# Patient Record
Sex: FEMALE | Race: WHITE | NOT HISPANIC OR LATINO
[De-identification: names, ages, dates, MRNs, and addresses within clinical notes are randomized per-mention and may not be internally consistent; named-entity substitution may affect disease eponyms.]

---

## 2017-01-04 ENCOUNTER — APPOINTMENT (OUTPATIENT)
Dept: ENDOCRINOLOGY | Facility: CLINIC | Age: 47
End: 2017-01-04

## 2017-01-04 VITALS
HEIGHT: 62.6 IN | DIASTOLIC BLOOD PRESSURE: 83 MMHG | HEART RATE: 66 BPM | SYSTOLIC BLOOD PRESSURE: 128 MMHG | TEMPERATURE: 98.5 F | WEIGHT: 178 LBS | BODY MASS INDEX: 31.94 KG/M2

## 2017-01-04 DIAGNOSIS — R30.0 DYSURIA: ICD-10-CM

## 2017-01-05 LAB
25(OH)D3 SERPL-MCNC: 30.2 NG/ML
ANION GAP SERPL CALC-SCNC: 16 MMOL/L
APPEARANCE: CLEAR
BILIRUBIN URINE: NEGATIVE
BLOOD URINE: NEGATIVE
BUN SERPL-MCNC: 17 MG/DL
CALCIUM SERPL-MCNC: 9.5 MG/DL
CALCIUM SERPL-MCNC: 9.5 MG/DL
CHLORIDE SERPL-SCNC: 101 MMOL/L
CO2 SERPL-SCNC: 23 MMOL/L
COLOR: YELLOW
CREAT SERPL-MCNC: 0.76 MG/DL
GLUCOSE QUALITATIVE U: NORMAL MG/DL
GLUCOSE SERPL-MCNC: 85 MG/DL
HBA1C MFR BLD HPLC: 5.4 %
KETONES URINE: NEGATIVE
LEUKOCYTE ESTERASE URINE: NEGATIVE
NITRITE URINE: NEGATIVE
PARATHYROID HORMONE INTACT: 53 PG/ML
PH URINE: 5
POTASSIUM SERPL-SCNC: 3.9 MMOL/L
PROTEIN URINE: NEGATIVE MG/DL
SODIUM SERPL-SCNC: 140 MMOL/L
SPECIFIC GRAVITY URINE: 1.02
T3 SERPL-MCNC: 104 NG/DL
T4 FREE SERPL-MCNC: 1.2 NG/DL
TSH SERPL-ACNC: 2.24 UU/ML
UROBILINOGEN URINE: NORMAL MG/DL

## 2017-02-16 ENCOUNTER — APPOINTMENT (OUTPATIENT)
Dept: INTERNAL MEDICINE | Facility: CLINIC | Age: 47
End: 2017-02-16

## 2017-02-16 VITALS
TEMPERATURE: 98.3 F | BODY MASS INDEX: 31.22 KG/M2 | HEART RATE: 69 BPM | DIASTOLIC BLOOD PRESSURE: 80 MMHG | SYSTOLIC BLOOD PRESSURE: 120 MMHG | RESPIRATION RATE: 14 BRPM | OXYGEN SATURATION: 98 % | WEIGHT: 174 LBS

## 2017-02-16 DIAGNOSIS — L72.9 FOLLICULAR CYST OF THE SKIN AND SUBCUTANEOUS TISSUE, UNSPECIFIED: ICD-10-CM

## 2017-03-16 ENCOUNTER — MESSAGE (OUTPATIENT)
Age: 47
End: 2017-03-16

## 2017-04-04 ENCOUNTER — RX RENEWAL (OUTPATIENT)
Age: 47
End: 2017-04-04

## 2017-10-03 ENCOUNTER — APPOINTMENT (OUTPATIENT)
Dept: INTERNAL MEDICINE | Facility: CLINIC | Age: 47
End: 2017-10-03
Payer: COMMERCIAL

## 2017-10-03 VITALS
BODY MASS INDEX: 32.02 KG/M2 | DIASTOLIC BLOOD PRESSURE: 80 MMHG | HEIGHT: 62 IN | WEIGHT: 174 LBS | TEMPERATURE: 98.7 F | RESPIRATION RATE: 14 BRPM | HEART RATE: 73 BPM | SYSTOLIC BLOOD PRESSURE: 120 MMHG | OXYGEN SATURATION: 99 %

## 2017-10-03 DIAGNOSIS — M79.89 OTHER SPECIFIED SOFT TISSUE DISORDERS: ICD-10-CM

## 2017-10-03 PROCEDURE — 90471 IMMUNIZATION ADMIN: CPT

## 2017-10-03 PROCEDURE — 93000 ELECTROCARDIOGRAM COMPLETE: CPT

## 2017-10-03 PROCEDURE — 90686 IIV4 VACC NO PRSV 0.5 ML IM: CPT

## 2017-10-03 PROCEDURE — 36415 COLL VENOUS BLD VENIPUNCTURE: CPT

## 2017-10-03 PROCEDURE — 99396 PREV VISIT EST AGE 40-64: CPT | Mod: 25

## 2017-10-04 LAB
ALBUMIN SERPL ELPH-MCNC: 4.3 G/DL
ALP BLD-CCNC: 56 U/L
ALT SERPL-CCNC: 13 U/L
ANION GAP SERPL CALC-SCNC: 13 MMOL/L
AST SERPL-CCNC: 16 U/L
BASOPHILS # BLD AUTO: 0.02 K/UL
BASOPHILS NFR BLD AUTO: 0.4 %
BILIRUB SERPL-MCNC: 0.6 MG/DL
BUN SERPL-MCNC: 18 MG/DL
CALCIUM SERPL-MCNC: 9.4 MG/DL
CHLORIDE SERPL-SCNC: 105 MMOL/L
CHOLEST SERPL-MCNC: 196 MG/DL
CHOLEST/HDLC SERPL: 2.6 RATIO
CO2 SERPL-SCNC: 25 MMOL/L
CREAT SERPL-MCNC: 0.78 MG/DL
EOSINOPHIL # BLD AUTO: 0.33 K/UL
EOSINOPHIL NFR BLD AUTO: 7 %
GLUCOSE SERPL-MCNC: 84 MG/DL
HCT VFR BLD CALC: 37.3 %
HDLC SERPL-MCNC: 74 MG/DL
HGB BLD-MCNC: 11.7 G/DL
IMM GRANULOCYTES NFR BLD AUTO: 0.2 %
LDLC SERPL CALC-MCNC: 110 MG/DL
LYMPHOCYTES # BLD AUTO: 1.83 K/UL
LYMPHOCYTES NFR BLD AUTO: 38.9 %
MAN DIFF?: NORMAL
MCHC RBC-ENTMCNC: 29.5 PG
MCHC RBC-ENTMCNC: 31.4 GM/DL
MCV RBC AUTO: 94.2 FL
MONOCYTES # BLD AUTO: 0.28 K/UL
MONOCYTES NFR BLD AUTO: 5.9 %
NEUTROPHILS # BLD AUTO: 2.24 K/UL
NEUTROPHILS NFR BLD AUTO: 47.6 %
PLATELET # BLD AUTO: 225 K/UL
POTASSIUM SERPL-SCNC: 4.2 MMOL/L
PROT SERPL-MCNC: 6.7 G/DL
RBC # BLD: 3.96 M/UL
RBC # FLD: 14.5 %
SODIUM SERPL-SCNC: 143 MMOL/L
TRIGL SERPL-MCNC: 61 MG/DL
TSH SERPL-ACNC: 2.62 UIU/ML
WBC # FLD AUTO: 4.71 K/UL

## 2018-03-06 ENCOUNTER — APPOINTMENT (OUTPATIENT)
Dept: ENDOCRINOLOGY | Facility: CLINIC | Age: 48
End: 2018-03-06
Payer: COMMERCIAL

## 2018-03-06 VITALS
SYSTOLIC BLOOD PRESSURE: 130 MMHG | HEART RATE: 61 BPM | DIASTOLIC BLOOD PRESSURE: 73 MMHG | HEIGHT: 62 IN | WEIGHT: 175 LBS | BODY MASS INDEX: 32.2 KG/M2

## 2018-03-06 PROCEDURE — 36415 COLL VENOUS BLD VENIPUNCTURE: CPT

## 2018-03-06 PROCEDURE — 99214 OFFICE O/P EST MOD 30 MIN: CPT | Mod: 25

## 2018-03-06 RX ORDER — NITROFURANTOIN (MONOHYDRATE/MACROCRYSTALS) 25; 75 MG/1; MG/1
100 CAPSULE ORAL
Qty: 14 | Refills: 0 | Status: DISCONTINUED | COMMUNITY
Start: 2017-03-16 | End: 2018-03-06

## 2018-03-07 LAB
25(OH)D3 SERPL-MCNC: 40.2 NG/ML
TSH SERPL-ACNC: 3.19 UIU/ML

## 2018-04-04 ENCOUNTER — APPOINTMENT (OUTPATIENT)
Dept: OTOLARYNGOLOGY | Facility: CLINIC | Age: 48
End: 2018-04-04
Payer: COMMERCIAL

## 2018-04-04 VITALS
DIASTOLIC BLOOD PRESSURE: 79 MMHG | OXYGEN SATURATION: 100 % | HEART RATE: 65 BPM | SYSTOLIC BLOOD PRESSURE: 118 MMHG | TEMPERATURE: 98.7 F

## 2018-04-04 DIAGNOSIS — H92.02 OTALGIA, LEFT EAR: ICD-10-CM

## 2018-04-04 DIAGNOSIS — R09.81 NASAL CONGESTION: ICD-10-CM

## 2018-04-04 DIAGNOSIS — M26.609 UNSPECIFIED TEMPOROMANDIBULAR JOINT DISORDER: ICD-10-CM

## 2018-04-04 DIAGNOSIS — J34.2 DEVIATED NASAL SEPTUM: ICD-10-CM

## 2018-04-04 PROCEDURE — 99203 OFFICE O/P NEW LOW 30 MIN: CPT | Mod: 25

## 2018-04-04 PROCEDURE — 31231 NASAL ENDOSCOPY DX: CPT

## 2018-06-13 ENCOUNTER — APPOINTMENT (OUTPATIENT)
Dept: ENDOCRINOLOGY | Facility: CLINIC | Age: 48
End: 2018-06-13
Payer: COMMERCIAL

## 2018-06-13 VITALS
BODY MASS INDEX: 32.39 KG/M2 | HEIGHT: 62 IN | DIASTOLIC BLOOD PRESSURE: 72 MMHG | WEIGHT: 176 LBS | SYSTOLIC BLOOD PRESSURE: 120 MMHG | HEART RATE: 60 BPM

## 2018-06-13 PROCEDURE — 99214 OFFICE O/P EST MOD 30 MIN: CPT

## 2018-06-14 LAB
BASOPHILS # BLD AUTO: 0.02 K/UL
BASOPHILS NFR BLD AUTO: 0.3 %
EOSINOPHIL # BLD AUTO: 0.5 K/UL
EOSINOPHIL NFR BLD AUTO: 7.6 %
HCT VFR BLD CALC: 34 %
HGB BLD-MCNC: 11 G/DL
IMM GRANULOCYTES NFR BLD AUTO: 0.2 %
LYMPHOCYTES # BLD AUTO: 2.46 K/UL
LYMPHOCYTES NFR BLD AUTO: 37.5 %
MAN DIFF?: NORMAL
MCHC RBC-ENTMCNC: 28.8 PG
MCHC RBC-ENTMCNC: 32.4 GM/DL
MCV RBC AUTO: 89 FL
MONOCYTES # BLD AUTO: 0.5 K/UL
MONOCYTES NFR BLD AUTO: 7.6 %
NEUTROPHILS # BLD AUTO: 3.07 K/UL
NEUTROPHILS NFR BLD AUTO: 46.8 %
PLATELET # BLD AUTO: 249 K/UL
RBC # BLD: 3.82 M/UL
RBC # FLD: 13.6 %
TSH SERPL-ACNC: 3.17 UIU/ML
WBC # FLD AUTO: 6.56 K/UL

## 2019-04-19 ENCOUNTER — RX RENEWAL (OUTPATIENT)
Age: 49
End: 2019-04-19

## 2019-04-25 VITALS
OXYGEN SATURATION: 100 % | WEIGHT: 175.49 LBS | TEMPERATURE: 98 F | HEART RATE: 67 BPM | RESPIRATION RATE: 14 BRPM | DIASTOLIC BLOOD PRESSURE: 82 MMHG | HEIGHT: 64 IN | SYSTOLIC BLOOD PRESSURE: 146 MMHG

## 2019-04-26 ENCOUNTER — OUTPATIENT (OUTPATIENT)
Dept: OUTPATIENT SERVICES | Facility: HOSPITAL | Age: 49
LOS: 1 days | Discharge: HOME | End: 2019-04-26
Payer: COMMERCIAL

## 2019-04-26 ENCOUNTER — RESULT REVIEW (OUTPATIENT)
Age: 49
End: 2019-04-26

## 2019-04-26 VITALS
RESPIRATION RATE: 20 BRPM | SYSTOLIC BLOOD PRESSURE: 135 MMHG | HEART RATE: 66 BPM | OXYGEN SATURATION: 99 % | DIASTOLIC BLOOD PRESSURE: 77 MMHG

## 2019-04-26 RX ORDER — ONDANSETRON 8 MG/1
4 TABLET, FILM COATED ORAL ONCE
Qty: 0 | Refills: 0 | Status: DISCONTINUED | OUTPATIENT
Start: 2019-04-26 | End: 2019-04-27

## 2019-04-26 RX ORDER — SODIUM CHLORIDE 9 MG/ML
1000 INJECTION, SOLUTION INTRAVENOUS
Qty: 0 | Refills: 0 | Status: DISCONTINUED | OUTPATIENT
Start: 2019-04-26 | End: 2019-04-27

## 2019-04-26 RX ORDER — HYDROMORPHONE HYDROCHLORIDE 2 MG/ML
0.5 INJECTION INTRAMUSCULAR; INTRAVENOUS; SUBCUTANEOUS
Qty: 0 | Refills: 0 | Status: DISCONTINUED | OUTPATIENT
Start: 2019-04-26 | End: 2019-04-27

## 2019-04-26 RX ORDER — KETOROLAC TROMETHAMINE 30 MG/ML
30 SYRINGE (ML) INJECTION ONCE
Qty: 0 | Refills: 0 | Status: DISCONTINUED | OUTPATIENT
Start: 2019-04-26 | End: 2019-04-27

## 2019-04-26 RX ORDER — OXYCODONE AND ACETAMINOPHEN 5; 325 MG/1; MG/1
1 TABLET ORAL EVERY 4 HOURS
Qty: 0 | Refills: 0 | Status: DISCONTINUED | OUTPATIENT
Start: 2019-04-26 | End: 2019-04-27

## 2019-04-26 RX ADMIN — HYDROMORPHONE HYDROCHLORIDE 0.5 MILLIGRAM(S): 2 INJECTION INTRAMUSCULAR; INTRAVENOUS; SUBCUTANEOUS at 14:29

## 2019-04-26 NOTE — PACU DISCHARGE NOTE - COMMENTS
Pt given discharge instructions with daughter at bedside. Pt and daughter verbalize understand with teachback method. IVL removed.

## 2019-04-26 NOTE — BRIEF OPERATIVE NOTE - NSICDXBRIEFPREOP_GEN_ALL_CORE_FT
PRE-OP DIAGNOSIS:  Left ovarian cyst 26-Apr-2019 18:01:14  Alton Boyce  Uterine polyp 26-Apr-2019 18:01:06  Alton Boyce

## 2019-04-26 NOTE — BRIEF OPERATIVE NOTE - NSICDXBRIEFPROCEDURE_GEN_ALL_CORE_FT
PROCEDURES:  Laparoscopic cystectomy 26-Apr-2019 18:00:46  Alton Boyce  Dilation and curettage of uterus 26-Apr-2019 18:00:29  Alton Boyce  Surgical hysteroscopy 26-Apr-2019 18:00:17  Alton Boyce

## 2019-04-26 NOTE — BRIEF OPERATIVE NOTE - OPERATION/FINDINGS
Hysteroscopy:  Fallopian tube ostia were visualized.  2 polyps 0.5 mm each were identified (anterior and posterior wall).  Normal Cervical canal.    Laparoscopy:  Normal abdominal anatomy  Normal Rt. adnexa and Lt, fallopian tube.  Lt. ovary with 2*2 cm cyst.

## 2019-04-27 PROCEDURE — 58558 HYSTEROSCOPY BIOPSY: CPT

## 2019-04-27 PROCEDURE — 86850 RBC ANTIBODY SCREEN: CPT

## 2019-04-27 PROCEDURE — 86901 BLOOD TYPING SEROLOGIC RH(D): CPT

## 2019-04-27 PROCEDURE — 88305 TISSUE EXAM BY PATHOLOGIST: CPT

## 2019-04-27 PROCEDURE — 86900 BLOOD TYPING SEROLOGIC ABO: CPT

## 2019-04-27 PROCEDURE — 58662 LAPAROSCOPY EXCISE LESIONS: CPT | Mod: LT

## 2019-04-29 LAB — SURGICAL PATHOLOGY STUDY: SIGNIFICANT CHANGE UP

## 2019-07-15 PROBLEM — N84.0 POLYP OF CORPUS UTERI: Chronic | Status: ACTIVE | Noted: 2019-04-25

## 2019-07-15 PROBLEM — E55.9 VITAMIN D DEFICIENCY, UNSPECIFIED: Chronic | Status: ACTIVE | Noted: 2019-04-25

## 2019-07-15 PROBLEM — E03.9 HYPOTHYROIDISM, UNSPECIFIED: Chronic | Status: ACTIVE | Noted: 2019-04-25

## 2019-07-16 ENCOUNTER — APPOINTMENT (OUTPATIENT)
Dept: ENDOCRINOLOGY | Facility: CLINIC | Age: 49
End: 2019-07-16
Payer: COMMERCIAL

## 2019-07-16 VITALS
BODY MASS INDEX: 32.37 KG/M2 | WEIGHT: 177 LBS | HEART RATE: 56 BPM | SYSTOLIC BLOOD PRESSURE: 125 MMHG | DIASTOLIC BLOOD PRESSURE: 79 MMHG

## 2019-07-16 PROCEDURE — 99214 OFFICE O/P EST MOD 30 MIN: CPT

## 2019-07-16 RX ORDER — METHYLPREDNISOLONE 4 MG/1
4 TABLET ORAL
Qty: 21 | Refills: 0 | Status: DISCONTINUED | COMMUNITY
Start: 2018-04-03 | End: 2019-07-16

## 2019-07-16 RX ORDER — CEFDINIR 300 MG/1
300 CAPSULE ORAL
Qty: 20 | Refills: 0 | Status: DISCONTINUED | COMMUNITY
Start: 2018-01-07 | End: 2019-07-16

## 2019-07-16 RX ORDER — AMOXICILLIN 875 MG/1
875 TABLET, FILM COATED ORAL
Qty: 20 | Refills: 0 | Status: DISCONTINUED | COMMUNITY
Start: 2018-04-03 | End: 2019-07-16

## 2019-07-17 LAB
25(OH)D3 SERPL-MCNC: 32.2 NG/ML
ALBUMIN SERPL ELPH-MCNC: 4.4 G/DL
ALP BLD-CCNC: 57 U/L
ALT SERPL-CCNC: 13 U/L
ANION GAP SERPL CALC-SCNC: 13 MMOL/L
AST SERPL-CCNC: 20 U/L
BASOPHILS # BLD AUTO: 0.02 K/UL
BASOPHILS NFR BLD AUTO: 0.3 %
BILIRUB SERPL-MCNC: 0.5 MG/DL
BUN SERPL-MCNC: 12 MG/DL
CALCIUM SERPL-MCNC: 9.6 MG/DL
CHLORIDE SERPL-SCNC: 101 MMOL/L
CHOLEST SERPL-MCNC: 180 MG/DL
CHOLEST/HDLC SERPL: 2.9 RATIO
CO2 SERPL-SCNC: 25 MMOL/L
CREAT SERPL-MCNC: 0.71 MG/DL
EOSINOPHIL # BLD AUTO: 0.34 K/UL
EOSINOPHIL NFR BLD AUTO: 4.8 %
ESTIMATED AVERAGE GLUCOSE: 103 MG/DL
GLUCOSE SERPL-MCNC: 79 MG/DL
HBA1C MFR BLD HPLC: 5.2 %
HCT VFR BLD CALC: 35.6 %
HDLC SERPL-MCNC: 62 MG/DL
HGB BLD-MCNC: 11.1 G/DL
IMM GRANULOCYTES NFR BLD AUTO: 0.1 %
LDLC SERPL CALC-MCNC: 102 MG/DL
LYMPHOCYTES # BLD AUTO: 2.53 K/UL
LYMPHOCYTES NFR BLD AUTO: 35.5 %
MAN DIFF?: NORMAL
MCHC RBC-ENTMCNC: 29.1 PG
MCHC RBC-ENTMCNC: 31.2 GM/DL
MCV RBC AUTO: 93.2 FL
MONOCYTES # BLD AUTO: 0.53 K/UL
MONOCYTES NFR BLD AUTO: 7.4 %
NEUTROPHILS # BLD AUTO: 3.69 K/UL
NEUTROPHILS NFR BLD AUTO: 51.9 %
PLATELET # BLD AUTO: 222 K/UL
POTASSIUM SERPL-SCNC: 4.1 MMOL/L
PROT SERPL-MCNC: 6.5 G/DL
RBC # BLD: 3.82 M/UL
RBC # FLD: 12.9 %
SODIUM SERPL-SCNC: 139 MMOL/L
TRIGL SERPL-MCNC: 81 MG/DL
TSH SERPL-ACNC: 1.87 UIU/ML
WBC # FLD AUTO: 7.12 K/UL

## 2019-07-17 NOTE — ASSESSMENT
[FreeTextEntry1] : Hypothyroidism. She has been clinically and biochemically euthyroid on her current dose of levothyroxine. We reviewed proper use and compliance with levothyroxine.\par Continue levothyroxine 88 mcg, 1 pill 6 days/week and 1.5 pills 1 day/week pending TSH\par \par Osteopenia/vitamin D deficiency. She has been noted to have osteopenia at the spine with normal values at the hip. Due to her age, her fracture risk is low and she does not warrant treatment. We can monitor interval bone density in one year. We discussed dietary calcium intake to maintain bone health; dietary calcium can reduce the risk of calcium oxalate stones.\par Calcium 1000 mg daily from diet (to be taken in divided doses as no more than 500-600 mg can be absorbed at one time)\par Continue current vitamin D regimen pending level\par \par Elevated body mass index. We reviewed lifestyle modifications for weight loss. We discussed referral to nutrition; she declines at this time but may reconsider. She is not interested in pharmacologic options for weight loss. \par Check complete blood count, comprehensive metabolic panel, hemoglobin A1c, lipid panel\par Lifestyle modification

## 2019-07-17 NOTE — HISTORY OF PRESENT ILLNESS
[FreeTextEntry1] : Ms. Arroyo is a 49 year-old woman with a history of hypothyroidism, osteopenia, vitamin D deficiency presenting for follow-up. I saw her for an initial visit in 2018 and last in 2018; she is a former patient of Dr. Vu.\par \par Hypothyroidism.\par She was diagnosed with hypothyroidism in .\par She had been taking levothyroxine 88 mcg daily for many years. In 2018 we adjusted her dose to levothyroxine 88 mcg, 1 pill 6 days/week and 1.5 pills 1 day/week.\par She is taking levothyroxine in the morning, on an empty stomach, with plain water, and waiting at least 30 minutes before eating. She is not taking calcium/iron/multivitamin.\par No history of radiation exposure.\par Sister with history of hypothyroidism (now ). \par \par Osteopenia/vitamin D deficiency.\par Menopause: NA\par Osteopenia diagnosed in  (report not available)\par Fracture history: None\par Family history: No parental history of hip fracture\par Treatment: None\par \par Falls: No\par Height loss: No\par Kidney stones: "Sand" in her kidney diagnosed a few years ago\par Dental health: Regular appointments; no issues\par Exercise: Active at work; no formal regimen\par Dairy intake: Was having yogurt daily but not recently\par Calcium supplements: None\par Multivitamin: None\par Vitamin D supplements: 5000 intl units daily\par \par Osteoporosis risk factors include: Postmenopausal status,  race, prior fracture, falls, height loss, small thin bones, tobacco use, excessive alcohol, anorexia, family history, vitamin D deficiency, corticosteroid use, seizure medications, malabsorption, hyperparathyroidism, hyperthyroidism.\par NEGATIVE EXCEPT: Postmenopausal status,  race\par \par Interim History \par She had laparoscopic surgery for endometrial polyps in May 2019.\par She states her leg feels "heavy"; she has been diagnosed with bursitis. She also has lower extremity edema.\par No change in weight, palpitations, diarrhea/constipation, hair/skin changes, depression/anxiety. Menses are regular.\par Medical and surgical history, medications, allergies, social and family history reviewed and updated as needed.

## 2019-07-17 NOTE — PHYSICAL EXAM
[Alert] : alert [No Acute Distress] : no acute distress [Healthy Appearance] : healthy appearance [Normal Sclera/Conjunctiva] : normal sclera/conjunctiva [Normal Oropharynx] : the oropharynx was normal [No Neck Mass] : no neck mass was observed [Supple] : the neck was supple [No LAD] : no lymphadenopathy [Thyroid Not Enlarged] : the thyroid was not enlarged [No Thyroid Nodules] : there were no palpable thyroid nodules [Normal Rate and Effort] : normal respiratory rhythm and effort [Clear to Auscultation] : lungs were clear to auscultation bilaterally [Normal Rate] : heart rate was normal  [Normal S1, S2] : normal S1 and S2 [Regular Rhythm] : with a regular rhythm [No Edema] : there was no peripheral edema [No Spinal Tenderness] : no spinal tenderness [No Stigmata of Cushings Syndrome] : no stigmata of cushings syndrome [Normal Gait] : normal gait [Normal Insight/Judgement] : insight and judgment were intact [Kyphosis] : no kyphosis present [Scoliosis] : scoliosis not present [Acanthosis Nigricans] : no acanthosis nigricans

## 2019-07-17 NOTE — DATA REVIEWED
[FreeTextEntry1] : Laboratories (October 3, 2017) reviewed and significant for:\par TSH 2.62 mIU/mL\par

## 2019-07-17 NOTE — ADDENDUM
[FreeTextEntry1] : Recent test results as below. TSH within range and will continue her current regimen of levothyroxine; prescription refill sent. Mild anemia with hemoglobin 11.1 g/dL. 10-year risk of heart disease or stroke is 0.7% and statin therapy not indicated. Other test results within range. I left a message and will send a letter with results. 7/17/19
Color consistent with ethnicity/race, warm, dry intact, resilient.

## 2019-08-22 ENCOUNTER — OUTPATIENT (OUTPATIENT)
Dept: OUTPATIENT SERVICES | Facility: HOSPITAL | Age: 49
LOS: 1 days | Discharge: HOME | End: 2019-08-22

## 2019-08-22 ENCOUNTER — RESULT REVIEW (OUTPATIENT)
Age: 49
End: 2019-08-22

## 2019-08-23 LAB — SURGICAL PATHOLOGY STUDY: SIGNIFICANT CHANGE UP

## 2019-10-25 ENCOUNTER — RX RENEWAL (OUTPATIENT)
Age: 49
End: 2019-10-25

## 2019-10-30 ENCOUNTER — APPOINTMENT (OUTPATIENT)
Dept: INTERNAL MEDICINE | Facility: CLINIC | Age: 49
End: 2019-10-30
Payer: COMMERCIAL

## 2019-10-30 VITALS
SYSTOLIC BLOOD PRESSURE: 130 MMHG | RESPIRATION RATE: 14 BRPM | HEIGHT: 62 IN | TEMPERATURE: 98.7 F | DIASTOLIC BLOOD PRESSURE: 78 MMHG | WEIGHT: 188 LBS | OXYGEN SATURATION: 98 % | HEART RATE: 66 BPM | BODY MASS INDEX: 34.6 KG/M2

## 2019-10-30 DIAGNOSIS — M25.50 PAIN IN UNSPECIFIED JOINT: ICD-10-CM

## 2019-10-30 DIAGNOSIS — R53.83 OTHER MALAISE: ICD-10-CM

## 2019-10-30 DIAGNOSIS — R53.81 OTHER MALAISE: ICD-10-CM

## 2019-10-30 PROCEDURE — 36415 COLL VENOUS BLD VENIPUNCTURE: CPT

## 2019-10-30 PROCEDURE — 99214 OFFICE O/P EST MOD 30 MIN: CPT

## 2019-10-30 NOTE — HISTORY OF PRESENT ILLNESS
[FreeTextEntry1] : joint pain [de-identified] : \par Cyst on liver- not sure if it was benign - concerned-  seen by gyn\par Had a procedure for endometriosis - laparoscopy and novashore- (burning of linig of uterus)\par 2 weeks agowith severe joint pain and fatigue.and headache  daily dizziness.  no fever no cough. no sleep.\par Had lyme in the past - treted.  No new tick or bug bites\par Has gained 10 lbs.  legs do swell.

## 2019-10-30 NOTE — PLAN
[FreeTextEntry1] : Joint pain - etiology unclear\par - send for labs mono, lyme, esr, thor, cbc\par -further workup pending results

## 2019-10-30 NOTE — REVIEW OF SYSTEMS
[Fatigue] : fatigue [Joint Pain] : joint pain [Headache] : headache [Confusion] : no confusion [Negative] : Psychiatric

## 2019-11-01 LAB
ALBUMIN SERPL ELPH-MCNC: 4.7 G/DL
ALP BLD-CCNC: 82 U/L
ALT SERPL-CCNC: 23 U/L
ANA SER IF-ACNC: NEGATIVE
ANION GAP SERPL CALC-SCNC: 13 MMOL/L
AST SERPL-CCNC: 20 U/L
B BURGDOR AB SER-IMP: NEGATIVE
B BURGDOR IGM PATRN SER IB-IMP: NEGATIVE
B BURGDOR18KD IGG SER QL IB: NORMAL
B BURGDOR23KD IGG SER QL IB: NORMAL
B BURGDOR23KD IGM SER QL IB: NORMAL
B BURGDOR28KD IGG SER QL IB: NORMAL
B BURGDOR30KD IGG SER QL IB: NORMAL
B BURGDOR31KD IGG SER QL IB: NORMAL
B BURGDOR39KD IGG SER QL IB: NORMAL
B BURGDOR39KD IGM SER QL IB: NORMAL
B BURGDOR41KD IGG SER QL IB: PRESENT
B BURGDOR41KD IGM SER QL IB: PRESENT
B BURGDOR45KD IGG SER QL IB: NORMAL
B BURGDOR58KD IGG SER QL IB: NORMAL
B BURGDOR66KD IGG SER QL IB: PRESENT
B BURGDOR93KD IGG SER QL IB: NORMAL
BASOPHILS # BLD AUTO: 0.02 K/UL
BASOPHILS NFR BLD AUTO: 0.3 %
BILIRUB SERPL-MCNC: 0.5 MG/DL
BUN SERPL-MCNC: 17 MG/DL
CALCIUM SERPL-MCNC: 9.6 MG/DL
CHLORIDE SERPL-SCNC: 105 MMOL/L
CO2 SERPL-SCNC: 23 MMOL/L
CREAT SERPL-MCNC: 0.74 MG/DL
EOSINOPHIL # BLD AUTO: 0.31 K/UL
EOSINOPHIL NFR BLD AUTO: 5.2 %
ERYTHROCYTE [SEDIMENTATION RATE] IN BLOOD BY WESTERGREN METHOD: 12 MM/HR
GLUCOSE SERPL-MCNC: 91 MG/DL
HCT VFR BLD CALC: 37.5 %
HETEROPH AB SER QL: NEGATIVE
HGB BLD-MCNC: 11.8 G/DL
IMM GRANULOCYTES NFR BLD AUTO: 0.3 %
LYMPHOCYTES # BLD AUTO: 2.28 K/UL
LYMPHOCYTES NFR BLD AUTO: 38.2 %
MAN DIFF?: NORMAL
MCHC RBC-ENTMCNC: 28.9 PG
MCHC RBC-ENTMCNC: 31.5 GM/DL
MCV RBC AUTO: 91.9 FL
MONOCYTES # BLD AUTO: 0.33 K/UL
MONOCYTES NFR BLD AUTO: 5.5 %
NEUTROPHILS # BLD AUTO: 3.01 K/UL
NEUTROPHILS NFR BLD AUTO: 50.5 %
PLATELET # BLD AUTO: 229 K/UL
POTASSIUM SERPL-SCNC: 4.3 MMOL/L
PROT SERPL-MCNC: 6.9 G/DL
RBC # BLD: 4.08 M/UL
RBC # FLD: 13.3 %
SODIUM SERPL-SCNC: 141 MMOL/L
WBC # FLD AUTO: 5.97 K/UL

## 2019-11-11 ENCOUNTER — MESSAGE (OUTPATIENT)
Age: 49
End: 2019-11-11

## 2020-02-17 ENCOUNTER — TRANSCRIPTION ENCOUNTER (OUTPATIENT)
Age: 50
End: 2020-02-17

## 2020-07-06 ENCOUNTER — APPOINTMENT (OUTPATIENT)
Dept: INTERNAL MEDICINE | Facility: CLINIC | Age: 50
End: 2020-07-06
Payer: COMMERCIAL

## 2020-07-06 DIAGNOSIS — K76.89 OTHER SPECIFIED DISEASES OF LIVER: ICD-10-CM

## 2020-07-06 PROCEDURE — 99442: CPT

## 2020-11-16 ENCOUNTER — NON-APPOINTMENT (OUTPATIENT)
Age: 50
End: 2020-11-16

## 2020-11-16 ENCOUNTER — APPOINTMENT (OUTPATIENT)
Dept: INTERNAL MEDICINE | Facility: CLINIC | Age: 50
End: 2020-11-16
Payer: COMMERCIAL

## 2020-11-16 PROCEDURE — 99213 OFFICE O/P EST LOW 20 MIN: CPT | Mod: 95

## 2020-11-16 NOTE — ASSESSMENT
[FreeTextEntry1] : reviewed natrual typical course for covid - 19 \par  no prescription medication needed.\par continue current management

## 2020-11-16 NOTE — REVIEW OF SYSTEMS
[Fever] : no fever [Chills] : no chills [Shortness Of Breath] : no shortness of breath [Dyspnea on Exertion] : no dyspnea on exertion

## 2020-11-16 NOTE — HISTORY OF PRESENT ILLNESS
[de-identified] : Dx'd with covid 4 days ago. Has symptoms of chest pain and back pain - upper.  throat irritation and red/. no sob.  feels tired. drinking fluids . rest,  no fever.  \par Taking dayquil, tylenol cold and flu.   - not overlapping.  \par \par Note had to change to telephonic half way due to poor concenction

## 2020-12-08 ENCOUNTER — APPOINTMENT (OUTPATIENT)
Dept: INTERNAL MEDICINE | Facility: CLINIC | Age: 50
End: 2020-12-08
Payer: COMMERCIAL

## 2020-12-08 VITALS
WEIGHT: 165 LBS | OXYGEN SATURATION: 98 % | BODY MASS INDEX: 30.36 KG/M2 | HEIGHT: 62 IN | HEART RATE: 70 BPM | RESPIRATION RATE: 14 BRPM | DIASTOLIC BLOOD PRESSURE: 78 MMHG | TEMPERATURE: 98.4 F | SYSTOLIC BLOOD PRESSURE: 116 MMHG

## 2020-12-08 DIAGNOSIS — U07.1 COVID-19: ICD-10-CM

## 2020-12-08 DIAGNOSIS — S46.819A STRAIN OF OTHER MUSCLES, FASCIA AND TENDONS AT SHOULDER AND UPPER ARM LEVEL, UNSPECIFIED ARM, INITIAL ENCOUNTER: ICD-10-CM

## 2020-12-08 PROCEDURE — 99214 OFFICE O/P EST MOD 30 MIN: CPT

## 2020-12-08 PROCEDURE — 36415 COLL VENOUS BLD VENIPUNCTURE: CPT

## 2020-12-08 PROCEDURE — 99072 ADDL SUPL MATRL&STAF TM PHE: CPT

## 2020-12-08 NOTE — HISTORY OF PRESENT ILLNESS
[FreeTextEntry1] : s/p covid infection [de-identified] : She is s/p Covid infection.  Had pain all over - Lost taste, head congestion, lost smell completely.  No significant SOB.  She now feels much better. Tested negative.    She was also given a zpak by urgent care 10 d after diagnosis.  \par Concerned about he blood work\par \par Also 3 mo ago developed left upper arm pain

## 2020-12-08 NOTE — PLAN
[FreeTextEntry1] : s/p covid infection improved significantly, check abs today\par \par  left medial deltoid strain\par  send for pt

## 2020-12-08 NOTE — PHYSICAL EXAM
[No Edema] : there was no peripheral edema [Normal] : affect was normal and insight and judgment were intact [de-identified] : full rom left shoulder  point td medial aspect left deltoid.

## 2020-12-10 LAB
ALBUMIN SERPL ELPH-MCNC: 4.3 G/DL
ALP BLD-CCNC: 105 U/L
ALT SERPL-CCNC: 56 U/L
ANION GAP SERPL CALC-SCNC: 11 MMOL/L
AST SERPL-CCNC: 32 U/L
BASOPHILS # BLD AUTO: 0.02 K/UL
BASOPHILS NFR BLD AUTO: 0.4 %
BILIRUB SERPL-MCNC: 0.3 MG/DL
BUN SERPL-MCNC: 19 MG/DL
CALCIUM SERPL-MCNC: 9.3 MG/DL
CHLORIDE SERPL-SCNC: 106 MMOL/L
CO2 SERPL-SCNC: 24 MMOL/L
CREAT SERPL-MCNC: 0.75 MG/DL
EOSINOPHIL # BLD AUTO: 0.3 K/UL
EOSINOPHIL NFR BLD AUTO: 5.5 %
GLUCOSE SERPL-MCNC: 89 MG/DL
HCT VFR BLD CALC: 38 %
HGB BLD-MCNC: 11.7 G/DL
IMM GRANULOCYTES NFR BLD AUTO: 0.2 %
LYMPHOCYTES # BLD AUTO: 1.94 K/UL
LYMPHOCYTES NFR BLD AUTO: 35.3 %
MAN DIFF?: NORMAL
MCHC RBC-ENTMCNC: 29.5 PG
MCHC RBC-ENTMCNC: 30.8 GM/DL
MCV RBC AUTO: 96 FL
MONOCYTES # BLD AUTO: 0.39 K/UL
MONOCYTES NFR BLD AUTO: 7.1 %
NEUTROPHILS # BLD AUTO: 2.84 K/UL
NEUTROPHILS NFR BLD AUTO: 51.5 %
PLATELET # BLD AUTO: 256 K/UL
POTASSIUM SERPL-SCNC: 4.3 MMOL/L
PROT SERPL-MCNC: 6.6 G/DL
RBC # BLD: 3.96 M/UL
RBC # FLD: 13.7 %
SODIUM SERPL-SCNC: 141 MMOL/L
WBC # FLD AUTO: 5.5 K/UL

## 2020-12-11 LAB
SARS-COV-2 IGG SERPL IA-ACNC: 6.08 INDEX
SARS-COV-2 IGG SERPL QL IA: POSITIVE

## 2020-12-18 ENCOUNTER — NON-APPOINTMENT (OUTPATIENT)
Age: 50
End: 2020-12-18

## 2020-12-29 ENCOUNTER — APPOINTMENT (OUTPATIENT)
Dept: ENDOCRINOLOGY | Facility: CLINIC | Age: 50
End: 2020-12-29
Payer: COMMERCIAL

## 2020-12-29 VITALS
HEART RATE: 76 BPM | BODY MASS INDEX: 30.73 KG/M2 | WEIGHT: 168 LBS | DIASTOLIC BLOOD PRESSURE: 83 MMHG | SYSTOLIC BLOOD PRESSURE: 123 MMHG

## 2020-12-29 PROCEDURE — 99072 ADDL SUPL MATRL&STAF TM PHE: CPT

## 2020-12-29 PROCEDURE — 99214 OFFICE O/P EST MOD 30 MIN: CPT

## 2021-01-31 ENCOUNTER — TRANSCRIPTION ENCOUNTER (OUTPATIENT)
Age: 51
End: 2021-01-31

## 2021-02-18 ENCOUNTER — RX RENEWAL (OUTPATIENT)
Age: 51
End: 2021-02-18

## 2021-04-22 ENCOUNTER — APPOINTMENT (OUTPATIENT)
Dept: RADIOLOGY | Facility: CLINIC | Age: 51
End: 2021-04-22
Payer: COMMERCIAL

## 2021-04-22 ENCOUNTER — RESULT REVIEW (OUTPATIENT)
Age: 51
End: 2021-04-22

## 2021-04-22 ENCOUNTER — OUTPATIENT (OUTPATIENT)
Dept: OUTPATIENT SERVICES | Facility: HOSPITAL | Age: 51
LOS: 1 days | End: 2021-04-22

## 2021-04-22 PROCEDURE — 77080 DXA BONE DENSITY AXIAL: CPT | Mod: 26

## 2021-05-11 NOTE — ADDENDUM
[FreeTextEntry1] : Recent bone density as below, significant for osteopenia at the spine and wrist and normal values at the hip sites. I will discuss with Ms. Arroyo at her upcoming appointment. 4/22/21\par \par I spoke with Ms. Arroyo about her results above. 5/11/21

## 2021-05-11 NOTE — ASSESSMENT
[FreeTextEntry1] : Hypothyroidism. She has been clinically and biochemically euthyroid on her current dose of levothyroxine. We reviewed proper use and compliance with levothyroxine.\par Continue levothyroxine 88 mcg daily\par \par Osteopenia/vitamin D deficiency. She has been noted to have osteopenia at the spine with normal values at the hip. Due to her age, her fracture risk is low and she does not warrant treatment. We can monitor interval bone density in one year. We discussed dietary calcium intake to maintain bone health; dietary calcium can reduce the risk of calcium oxalate stones.\par Calcium 1000 mg daily from diet (to be taken in divided doses as no more than 500-600 mg can be absorbed at one time)\par Continue current vitamin D regimen\par \par Elevated body mass index. I congratulated her on her weight loss. We reviewed lifestyle modifications for weight loss. We discussed referral to nutrition. She has not been interested in pharmacologic options for weight loss. \par Lifestyle modification\par Referral to nutrition\par \par Return to see me in 6 months.

## 2021-05-11 NOTE — PHYSICAL EXAM
[Alert] : alert [Healthy Appearance] : healthy appearance [No Acute Distress] : no acute distress [Normal Sclera/Conjunctiva] : normal sclera/conjunctiva [No Stigmata of Cushings Syndrome] : no stigmata of Cushings Syndrome [Normal Gait] : normal gait [Normal Insight/Judgement] : insight and judgment were intact [Kyphosis] : no kyphosis present [Acanthosis Nigricans] : no acanthosis nigricans [de-identified] : no moon facies, no supraclavicular fat pads

## 2021-05-11 NOTE — HISTORY OF PRESENT ILLNESS
[FreeTextEntry1] : Ms. Arroyo is a 50 year-old woman with a history of hypothyroidism, osteopenia, vitamin D deficiency presenting for follow-up. I saw her for an initial visit in 2018 and last in 2019; she is a former patient of Dr. Ulises Vu.\par \par Hypothyroidism.\par She was diagnosed with hypothyroidism in .\par She had been taking levothyroxine 88 mcg daily for many years. In 2018 we adjusted her dose to levothyroxine 88 mcg, 1 pill 6 days/week and 1.5 pills 1 day/week. She has been taking levothyroxine 88 mcg daily. \par She is taking levothyroxine in the morning, on an empty stomach, with plain water, and waiting at least 30 minutes before eating. She is taking a calcium supplement with her levothyroxine. \par No history of radiation exposure.\par Sister with history of hypothyroidism (now ). \par \par Osteopenia/vitamin D deficiency.\par Menopause: NA\par Osteopenia diagnosed in  (report not available)\par Fracture history: None\par Family history: No parental history of hip fracture\par Treatment: None\par \par Falls: No\par Height loss: No\par Kidney stones: "Sand" in her kidney diagnosed a few years ago\par Dental health: Regular appointments; no issues\par Exercise: Active at work; no formal regimen\par Dairy intake: Was having yogurt daily but not recently\par Calcium supplements: None\par Multivitamin: None\par Vitamin D supplements: 5000 intl units daily\par \par Osteoporosis risk factors include: Postmenopausal status,  race, prior fracture, falls, height loss, small thin bones, tobacco use, excessive alcohol, anorexia, family history, vitamin D deficiency, corticosteroid use, seizure medications, malabsorption, hyperparathyroidism, hyperthyroidism.\par NEGATIVE EXCEPT: Postmenopausal status,  race\par \par Interim History \par She has been taking levothyroxine 88 mcg daily. She is now taking a calcium supplement with her levothyroxine. \par She has decreased portion sizes of carbohydrates. She is having more fruits/vegetables. She is very active at work. \par Weight is down 20 pounds since last year; weight loss has plateaued recently. \par Medical and surgical history, medications, allergies, social and family history reviewed and updated as needed.

## 2021-06-08 ENCOUNTER — APPOINTMENT (OUTPATIENT)
Dept: ENDOCRINOLOGY | Facility: CLINIC | Age: 51
End: 2021-06-08

## 2021-08-04 ENCOUNTER — APPOINTMENT (OUTPATIENT)
Dept: ENDOCRINOLOGY | Facility: CLINIC | Age: 51
End: 2021-08-04

## 2021-08-17 ENCOUNTER — NON-APPOINTMENT (OUTPATIENT)
Age: 51
End: 2021-08-17

## 2021-08-17 ENCOUNTER — APPOINTMENT (OUTPATIENT)
Dept: ENDOCRINOLOGY | Facility: CLINIC | Age: 51
End: 2021-08-17
Payer: COMMERCIAL

## 2021-08-17 VITALS
HEART RATE: 65 BPM | WEIGHT: 170 LBS | BODY MASS INDEX: 31.28 KG/M2 | DIASTOLIC BLOOD PRESSURE: 73 MMHG | HEIGHT: 62 IN | SYSTOLIC BLOOD PRESSURE: 120 MMHG

## 2021-08-17 PROCEDURE — 99214 OFFICE O/P EST MOD 30 MIN: CPT

## 2021-08-17 RX ORDER — CALCIUM CITRATE/VITAMIN D3 315MG-6.25
TABLET ORAL
Refills: 0 | Status: DISCONTINUED | COMMUNITY
End: 2021-08-17

## 2021-08-19 LAB
25(OH)D3 SERPL-MCNC: 51.2 NG/ML
ALBUMIN SERPL ELPH-MCNC: 4.2 G/DL
ALP BLD-CCNC: 68 U/L
ALT SERPL-CCNC: 20 U/L
ANION GAP SERPL CALC-SCNC: 11 MMOL/L
AST SERPL-CCNC: 18 U/L
BASOPHILS # BLD AUTO: 0.02 K/UL
BASOPHILS NFR BLD AUTO: 0.4 %
BILIRUB SERPL-MCNC: 0.4 MG/DL
BUN SERPL-MCNC: 23 MG/DL
CALCIUM SERPL-MCNC: 8.9 MG/DL
CHLORIDE SERPL-SCNC: 106 MMOL/L
CHOLEST SERPL-MCNC: 187 MG/DL
CO2 SERPL-SCNC: 24 MMOL/L
CREAT SERPL-MCNC: 0.82 MG/DL
EOSINOPHIL # BLD AUTO: 0.25 K/UL
EOSINOPHIL NFR BLD AUTO: 4.7 %
ESTIMATED AVERAGE GLUCOSE: 105 MG/DL
GLUCOSE SERPL-MCNC: 83 MG/DL
HBA1C MFR BLD HPLC: 5.3 %
HCT VFR BLD CALC: 34.7 %
HDLC SERPL-MCNC: 78 MG/DL
HGB BLD-MCNC: 11.2 G/DL
IMM GRANULOCYTES NFR BLD AUTO: 0.2 %
LDLC SERPL CALC-MCNC: 100 MG/DL
LYMPHOCYTES # BLD AUTO: 1.93 K/UL
LYMPHOCYTES NFR BLD AUTO: 36.3 %
MAN DIFF?: NORMAL
MCHC RBC-ENTMCNC: 29.4 PG
MCHC RBC-ENTMCNC: 32.3 GM/DL
MCV RBC AUTO: 91.1 FL
MONOCYTES # BLD AUTO: 0.42 K/UL
MONOCYTES NFR BLD AUTO: 7.9 %
NEUTROPHILS # BLD AUTO: 2.68 K/UL
NEUTROPHILS NFR BLD AUTO: 50.5 %
NONHDLC SERPL-MCNC: 110 MG/DL
PLATELET # BLD AUTO: 211 K/UL
POTASSIUM SERPL-SCNC: 4.4 MMOL/L
PROT SERPL-MCNC: 6 G/DL
RBC # BLD: 3.81 M/UL
RBC # FLD: 12.5 %
SODIUM SERPL-SCNC: 140 MMOL/L
TRIGL SERPL-MCNC: 47 MG/DL
TSH SERPL-ACNC: 1.29 UIU/ML
VIT B12 SERPL-MCNC: 433 PG/ML
WBC # FLD AUTO: 5.31 K/UL

## 2021-08-19 NOTE — PHYSICAL EXAM
[Alert] : alert [Healthy Appearance] : healthy appearance [No Acute Distress] : no acute distress [Normal Sclera/Conjunctiva] : normal sclera/conjunctiva [No Stigmata of Cushings Syndrome] : no stigmata of Cushings Syndrome [Normal Gait] : normal gait [Normal Insight/Judgement] : insight and judgment were intact [Normal Hearing] : hearing was normal [No Respiratory Distress] : no respiratory distress [Kyphosis] : no kyphosis present [Acanthosis Nigricans] : no acanthosis nigricans [de-identified] : no moon facies, no supraclavicular fat pads

## 2021-08-19 NOTE — HISTORY OF PRESENT ILLNESS
[FreeTextEntry1] : Ms. Arroyo is a 51 year-old woman with a history of hypothyroidism, osteopenia, vitamin D deficiency presenting for follow-up. I saw her for an initial visit in 2018 and last in 2020; she is a former patient of Dr. Ulises Vu.\par \par Hypothyroidism.\par She was diagnosed with hypothyroidism in .\par She had been taking levothyroxine 88 mcg daily for many years.\par She is taking levothyroxine in the morning, on an empty stomach, with plain water, and waiting at least 30 minutes before eating. She is not taking calcium/iron/multivitamin. \par No history of radiation exposure.\par Sister with history of hypothyroidism (now ). \par \par Osteopenia/vitamin D deficiency.\par Menopause: NA\par Osteopenia diagnosed in  (report not available); most recent bone density as below\par Fracture history: None\par Family history: No parental history of hip fracture\par Treatment: None\par \par Falls: No\par Height loss: No\par Kidney stones: "Sand" in her kidney diagnosed a few years ago\par Dental health: Regular appointments; no issues\par Exercise: Active at work; no formal regimen\par Dairy intake: Was having yogurt daily but not recently\par Calcium supplements: None\par Multivitamin: None\par Vitamin D supplements: 2000 intl units daily\par \par Osteoporosis risk factors include: Postmenopausal status,  race, prior fracture, falls, height loss, small thin bones, tobacco use, excessive alcohol, anorexia, family history, vitamin D deficiency, corticosteroid use, seizure medications, malabsorption, hyperparathyroidism, hyperthyroidism.\par NEGATIVE EXCEPT: Postmenopausal status,  race\par \par Interim History \par Bone density from 2021 as below, significant for osteopenia at the spine and wrist and normal values at the hip sites. \par She has decreased portion sizes of carbohydrates. She is having more fruits/vegetables. She is very active at work. \par She has been stressed due to a change in hours during the pandemic. She has less free time. \par Weight is up 2 pounds since last year; weight is overall down 18 pounds from 188 pounds. \par Medical and surgical history, medications, allergies, social and family history reviewed and updated as needed.

## 2021-08-19 NOTE — ADDENDUM
[FreeTextEntry1] : Recent laboratory results as below; discussed with Ms. Cruz. Hemoglobin borderline low as it has been in the past; advised follow-up with primary care. TSH within range. Her 10 year risk of heart disease or stroke is 0.6% and statin therapy not indicated for lipid panel. Other test results within range. 8/19/21

## 2021-08-19 NOTE — ASSESSMENT
[FreeTextEntry1] : Hypothyroidism. She has been clinically and biochemically euthyroid on her current dose of levothyroxine. We reviewed proper use and compliance with levothyroxine.\par Continue levothyroxine 88 mcg daily pending level\par \par Osteopenia/vitamin D deficiency. She has been noted to have osteopenia at the spine with normal values at the hip. Due to her age, her fracture risk is low and she does not warrant treatment. We can monitor interval bone density in two years. We discussed dietary calcium intake to maintain bone health; dietary calcium can reduce the risk of calcium oxalate stones.\par Check calciotropic panel\par Calcium 1000 mg daily from diet (to be taken in divided doses as no more than 500-600 mg can be absorbed at one time)\par Continue current vitamin D regimen pending level\par \par Elevated body mass index. I congratulated her on her overall weight loss. We reviewed lifestyle modifications for weight loss. She has not been interested in pharmacologic options for weight loss. \par Check hemoglobin A1c and lipid panel\par Lifestyle modification

## 2021-12-14 ENCOUNTER — RX RENEWAL (OUTPATIENT)
Age: 51
End: 2021-12-14

## 2022-02-08 ENCOUNTER — APPOINTMENT (OUTPATIENT)
Dept: INTERNAL MEDICINE | Facility: CLINIC | Age: 52
End: 2022-02-08
Payer: COMMERCIAL

## 2022-02-08 VITALS
DIASTOLIC BLOOD PRESSURE: 80 MMHG | BODY MASS INDEX: 32.2 KG/M2 | HEIGHT: 62 IN | HEART RATE: 65 BPM | SYSTOLIC BLOOD PRESSURE: 122 MMHG | RESPIRATION RATE: 14 BRPM | TEMPERATURE: 97.3 F | OXYGEN SATURATION: 97 % | WEIGHT: 175 LBS

## 2022-02-08 DIAGNOSIS — M54.9 DORSALGIA, UNSPECIFIED: ICD-10-CM

## 2022-02-08 DIAGNOSIS — Z00.00 ENCOUNTER FOR GENERAL ADULT MEDICAL EXAMINATION W/OUT ABNORMAL FINDINGS: ICD-10-CM

## 2022-02-08 DIAGNOSIS — R53.82 CHRONIC FATIGUE, UNSPECIFIED: ICD-10-CM

## 2022-02-08 PROCEDURE — 36415 COLL VENOUS BLD VENIPUNCTURE: CPT

## 2022-02-08 PROCEDURE — 93000 ELECTROCARDIOGRAM COMPLETE: CPT

## 2022-02-08 PROCEDURE — 99396 PREV VISIT EST AGE 40-64: CPT

## 2022-02-08 NOTE — HISTORY OF PRESENT ILLNESS
[de-identified] : 52 yo f with hypothyroidism\par Headache- can be severe 1 every 3 months - at urgent care given nsaid injection\par Pain right neck and arm.  - has been 3 months -no specific trauma.  Takes occasional ibuprofen 0 does help a little.  \par Increasing fatigue with steps\par s/p infection in January- sinus congestion and headache.      \par Extremely busy with work - no time for exercise.  more stress.  \par \par dexa : 2021: with osteopenia of radius\par colonoscopy in 2020 due for a 1 year repeat due to poor prep\par Mammo: March 2021 with gyn

## 2022-02-08 NOTE — PLAN
[FreeTextEntry1] : \par \par Headache - reviewed - likely mmigraine  may be related menopause\par \par Neck pain - start PT  naproxen and cyclbenzxaprine\par  consider f/up with pain management\par \par set up cologuard\par \par labs today\par \par

## 2022-02-08 NOTE — PHYSICAL EXAM
[No Edema] : there was no peripheral edema [Normal] : affect was normal and insight and judgment were intact [de-identified] : neck tenderness right side [de-identified] : tender right neck and upper trapezius

## 2022-02-09 LAB
ALBUMIN SERPL ELPH-MCNC: 4.4 G/DL
ALP BLD-CCNC: 66 U/L
ALT SERPL-CCNC: 15 U/L
ANION GAP SERPL CALC-SCNC: 11 MMOL/L
AST SERPL-CCNC: 18 U/L
BASOPHILS # BLD AUTO: 0.02 K/UL
BASOPHILS NFR BLD AUTO: 0.5 %
BILIRUB SERPL-MCNC: 0.3 MG/DL
BUN SERPL-MCNC: 12 MG/DL
CALCIUM SERPL-MCNC: 9.3 MG/DL
CHLORIDE SERPL-SCNC: 105 MMOL/L
CHOLEST SERPL-MCNC: 184 MG/DL
CO2 SERPL-SCNC: 25 MMOL/L
CREAT SERPL-MCNC: 0.66 MG/DL
EOSINOPHIL # BLD AUTO: 0.25 K/UL
EOSINOPHIL NFR BLD AUTO: 5.9 %
ESTIMATED AVERAGE GLUCOSE: 105 MG/DL
ESTRADIOL SERPL-MCNC: 99 PG/ML
FSH SERPL-MCNC: 29.6 IU/L
GLUCOSE SERPL-MCNC: 102 MG/DL
HBA1C MFR BLD HPLC: 5.3 %
HCT VFR BLD CALC: 37.2 %
HDLC SERPL-MCNC: 68 MG/DL
HGB BLD-MCNC: 11.2 G/DL
IMM GRANULOCYTES NFR BLD AUTO: 0.2 %
LDLC SERPL CALC-MCNC: 101 MG/DL
LYMPHOCYTES # BLD AUTO: 1.49 K/UL
LYMPHOCYTES NFR BLD AUTO: 35.2 %
MAN DIFF?: NORMAL
MCHC RBC-ENTMCNC: 29.5 PG
MCHC RBC-ENTMCNC: 30.1 GM/DL
MCV RBC AUTO: 97.9 FL
MONOCYTES # BLD AUTO: 0.32 K/UL
MONOCYTES NFR BLD AUTO: 7.6 %
NEUTROPHILS # BLD AUTO: 2.14 K/UL
NEUTROPHILS NFR BLD AUTO: 50.6 %
NONHDLC SERPL-MCNC: 116 MG/DL
PLATELET # BLD AUTO: 217 K/UL
POTASSIUM SERPL-SCNC: 4.7 MMOL/L
PROT SERPL-MCNC: 6.2 G/DL
RBC # BLD: 3.8 M/UL
RBC # FLD: 13.9 %
SODIUM SERPL-SCNC: 141 MMOL/L
TRIGL SERPL-MCNC: 75 MG/DL
TSH SERPL-ACNC: 1.36 UIU/ML
WBC # FLD AUTO: 4.23 K/UL

## 2022-03-02 ENCOUNTER — NON-APPOINTMENT (OUTPATIENT)
Age: 52
End: 2022-03-02

## 2022-04-11 NOTE — ASU PREOP CHECKLIST - STERILIZATION AFFIRMATION
Patient called and left message on refill line concerning her Tramadol  States someone called her  PCB  n/a

## 2022-10-05 ENCOUNTER — RX RENEWAL (OUTPATIENT)
Age: 52
End: 2022-10-05

## 2023-01-16 ENCOUNTER — RX RENEWAL (OUTPATIENT)
Age: 53
End: 2023-01-16

## 2023-03-10 ENCOUNTER — RX RENEWAL (OUTPATIENT)
Age: 53
End: 2023-03-10

## 2023-03-15 ENCOUNTER — RX RENEWAL (OUTPATIENT)
Age: 53
End: 2023-03-15

## 2023-03-28 ENCOUNTER — APPOINTMENT (OUTPATIENT)
Dept: ENDOCRINOLOGY | Facility: CLINIC | Age: 53
End: 2023-03-28
Payer: COMMERCIAL

## 2023-03-28 VITALS — WEIGHT: 182 LBS | BODY MASS INDEX: 33.29 KG/M2

## 2023-03-28 PROCEDURE — 97802 MEDICAL NUTRITION INDIV IN: CPT

## 2023-05-12 ENCOUNTER — APPOINTMENT (OUTPATIENT)
Age: 53
End: 2023-05-12

## 2023-05-17 ENCOUNTER — NON-APPOINTMENT (OUTPATIENT)
Age: 53
End: 2023-05-17

## 2023-05-17 ENCOUNTER — APPOINTMENT (OUTPATIENT)
Dept: ENDOCRINOLOGY | Facility: CLINIC | Age: 53
End: 2023-05-17
Payer: COMMERCIAL

## 2023-05-17 VITALS
DIASTOLIC BLOOD PRESSURE: 77 MMHG | BODY MASS INDEX: 33.13 KG/M2 | SYSTOLIC BLOOD PRESSURE: 132 MMHG | HEART RATE: 60 BPM | WEIGHT: 180 LBS | HEIGHT: 62 IN

## 2023-05-17 DIAGNOSIS — Z13.1 ENCOUNTER FOR SCREENING FOR DIABETES MELLITUS: ICD-10-CM

## 2023-05-17 DIAGNOSIS — Z13.220 ENCOUNTER FOR SCREENING FOR LIPOID DISORDERS: ICD-10-CM

## 2023-05-17 PROCEDURE — 99214 OFFICE O/P EST MOD 30 MIN: CPT

## 2023-05-18 LAB
25(OH)D3 SERPL-MCNC: 38.6 NG/ML
ALBUMIN SERPL ELPH-MCNC: 4.5 G/DL
ALP BLD-CCNC: 90 U/L
ALT SERPL-CCNC: 25 U/L
ANION GAP SERPL CALC-SCNC: 12 MMOL/L
AST SERPL-CCNC: 23 U/L
BASOPHILS # BLD AUTO: 0.03 K/UL
BASOPHILS NFR BLD AUTO: 0.5 %
BILIRUB SERPL-MCNC: 0.5 MG/DL
BUN SERPL-MCNC: 17 MG/DL
CALCIUM SERPL-MCNC: 9.7 MG/DL
CHLORIDE SERPL-SCNC: 105 MMOL/L
CHOLEST SERPL-MCNC: 224 MG/DL
CO2 SERPL-SCNC: 25 MMOL/L
CREAT SERPL-MCNC: 0.74 MG/DL
EGFR: 98 ML/MIN/1.73M2
EOSINOPHIL # BLD AUTO: 0.33 K/UL
EOSINOPHIL NFR BLD AUTO: 5.5 %
ESTIMATED AVERAGE GLUCOSE: 108 MG/DL
FERRITIN SERPL-MCNC: 124 NG/ML
GLUCOSE SERPL-MCNC: 85 MG/DL
HBA1C MFR BLD HPLC: 5.4 %
HCT VFR BLD CALC: 36.6 %
HDLC SERPL-MCNC: 75 MG/DL
HGB BLD-MCNC: 11.6 G/DL
IMM GRANULOCYTES NFR BLD AUTO: 0.2 %
IRON SATN MFR SERPL: 33 %
IRON SERPL-MCNC: 106 UG/DL
LDLC SERPL CALC-MCNC: 134 MG/DL
LYMPHOCYTES # BLD AUTO: 2.33 K/UL
LYMPHOCYTES NFR BLD AUTO: 39.1 %
MAN DIFF?: NORMAL
MCHC RBC-ENTMCNC: 28.8 PG
MCHC RBC-ENTMCNC: 31.7 GM/DL
MCV RBC AUTO: 90.8 FL
MONOCYTES # BLD AUTO: 0.44 K/UL
MONOCYTES NFR BLD AUTO: 7.4 %
NEUTROPHILS # BLD AUTO: 2.82 K/UL
NEUTROPHILS NFR BLD AUTO: 47.3 %
NONHDLC SERPL-MCNC: 148 MG/DL
PLATELET # BLD AUTO: 203 K/UL
POTASSIUM SERPL-SCNC: 4.3 MMOL/L
PROT SERPL-MCNC: 6.4 G/DL
RBC # BLD: 4.03 M/UL
RBC # FLD: 12.6 %
SODIUM SERPL-SCNC: 142 MMOL/L
TIBC SERPL-MCNC: 324 UG/DL
TRIGL SERPL-MCNC: 71 MG/DL
TSH SERPL-ACNC: 1.2 UIU/ML
UIBC SERPL-MCNC: 219 UG/DL
VIT B12 SERPL-MCNC: 454 PG/ML
WBC # FLD AUTO: 5.96 K/UL

## 2023-05-26 NOTE — HISTORY OF PRESENT ILLNESS
[FreeTextEntry1] : Ms. Arroyo is a 52 year-old woman with a history of hypothyroidism, osteopenia, and vitamin D deficiency presenting for follow-up of her endocrine issues. I saw her for an initial visit in 2018 and last in 2021.\par \par Hypothyroidism.\par She was diagnosed with hypothyroidism in .\par She has been taking levothyroxine 88 mcg daily for many years.\par She is taking levothyroxine in the morning, on an empty stomach, with plain water, and waiting at least 30 minutes before eating. She is not taking calcium/iron/multivitamin. \par No history of radiation exposure.\par Sister with history of hypothyroidism (now ). \par \par Osteopenia/vitamin D deficiency.\par Menopause: NA\par Osteopenia diagnosed in  (report not available); most recent bone density as below\par Fracture history: None\par Family history: No parental history of hip fracture\par Treatment: None\par \par Falls: No\par Height loss: No\par Kidney stones: "Sand" in her kidney diagnosed a few years ago\par Dental health: Regular appointments; no issues\par Exercise: Active at work; no formal regimen\par Dairy intake: 1/2-1 serving daily (milk with coffee, yogurt and cheese a few times a week)\par Calcium supplements: None\par Multivitamin: None\par Vitamin D supplements: 2000 intl units daily\par \par Osteoporosis risk factors include: Postmenopausal status,  race, prior fracture, falls, height loss, small thin bones, tobacco use, excessive alcohol, anorexia, family history, vitamin D deficiency, corticosteroid use, seizure medications, malabsorption, hyperparathyroidism, hyperthyroidism.\par NEGATIVE EXCEPT: Postmenopausal status,  race\par \par Interim History \par She has seen Dr. Marv Roblero and Alicia Vasquez RD; notes reviewed. \par No recent laboratory results. \par She has had neck and back pain. Weight is up 10 pounds since her last visit with me. \par Medical and surgical history, medications, allergies, social and family history reviewed and updated as needed.

## 2023-05-26 NOTE — PHYSICAL EXAM
[Alert] : alert [Healthy Appearance] : healthy appearance [No Acute Distress] : no acute distress [Normal Sclera/Conjunctiva] : normal sclera/conjunctiva [Normal Hearing] : hearing was normal [No Respiratory Distress] : no respiratory distress [No Stigmata of Cushings Syndrome] : no stigmata of Cushings Syndrome [Normal Gait] : normal gait [Normal Insight/Judgement] : insight and judgment were intact [Kyphosis] : no kyphosis present [Acanthosis Nigricans] : no acanthosis nigricans [de-identified] : no moon facies, no supraclavicular fat pads

## 2023-05-26 NOTE — ADDENDUM
[FreeTextEntry1] : Recent laboratory results as below. Her 10 year risk of heart attack or stroke is 1.0% and statin therapy not clearly indicated for lipid panel; recommend lifestyle modifications. TSH, HbA1c, vitamin D, and other test results within the normal range. I left a telephone message for Ms. Arroyo and will send a letter with results. 5/18/23\par \par Recent bone density as below. The report from [facility] incorrectly excluded L2, which would then put the value in the osteoporosis range. There is osteopenia by the World Health Organization criteria. Vertebral fracture assessment without evidence of compression fractures. Her 10 year fracture risk calculated by [facility] is 8.2% for major osteoporotic fracture and 0.2% for hip fracture, below the treatment thresholds. I left a telephone message for Ms. Arroyo to discuss. 5/26/23\par \par Most recent bone mineral density\par Date: May 19, 2023\par Source: Hologic\par Site: [facility]\par \par Site	BMD	T-score	Change previous	Change baseline	\par Lumbar spine	0.787	-2.4			\par Femoral neck	0.847	0.0			\par Total hip                  	0.967	+0.2			\par Distal radius	0.653	-0.7			\par DXA comments: Baseline scan at this facility; left hip values\par Vertebral fracture assessment without evidence of compression fractures T8-L5 (my read).\par \par Impression: I have personally reviewed the DXA images and report. The report from [facility] incorrectly excluded L2, which would then put the value in the osteoporosis range. There is osteopenia by the World Health Organization criteria. Vertebral fracture assessment without evidence of compression fractures.

## 2023-05-26 NOTE — ASSESSMENT
[FreeTextEntry1] : Hypothyroidism. She has been biochemically euthyroid on her current dose of levothyroxine. We have reviewed proper use and compliance with levothyroxine.\par Continue levothyroxine 88 mcg daily pending level\par \par Osteopenia/vitamin D deficiency. She has been noted to have osteopenia at the spine with normal values at the hip. Due to her age, her fracture risk is low and she does not warrant treatment. We can monitor interval bone density testing. We discussed dietary calcium intake to maintain bone health; dietary calcium can reduce the risk of calcium oxalate stones.\par Monitor calciotropic panel\par Calcium 1000 mg daily from diet (to be taken in divided doses as no more than 500-600 mg can be absorbed at one time)\par Continue current vitamin D regimen pending level\par \par Elevated body mass index. We reviewed lifestyle modifications for weight loss. She has declined follow-up with nutrition. We discussed pharmacologic options for weight loss. She has been hesitant to consider pharmacologic therapy but is amenable to a trial of metformin.\par Monitor hemoglobin A1c and lipid panel\par Lifestyle modification\par Start metformin  mg daily for one week, then 1500 mg daily\par \par Return to see me in 4 months or earlier as needed.

## 2023-06-05 ENCOUNTER — APPOINTMENT (OUTPATIENT)
Dept: HEART AND VASCULAR | Facility: CLINIC | Age: 53
End: 2023-06-05
Payer: COMMERCIAL

## 2023-06-05 VITALS
HEIGHT: 64 IN | WEIGHT: 180 LBS | TEMPERATURE: 98 F | HEART RATE: 61 BPM | BODY MASS INDEX: 30.73 KG/M2 | DIASTOLIC BLOOD PRESSURE: 70 MMHG | OXYGEN SATURATION: 97 % | SYSTOLIC BLOOD PRESSURE: 132 MMHG

## 2023-06-05 DIAGNOSIS — E78.5 HYPERLIPIDEMIA, UNSPECIFIED: ICD-10-CM

## 2023-06-05 PROCEDURE — 93000 ELECTROCARDIOGRAM COMPLETE: CPT

## 2023-06-05 PROCEDURE — 99215 OFFICE O/P EST HI 40 MIN: CPT | Mod: 25

## 2023-06-05 NOTE — DISCUSSION/SUMMARY
[Patient] : the patient [___ Month(s)] : in [unfilled] month(s) [EKG obtained to assist in diagnosis and management of assessed problem(s)] : EKG obtained to assist in diagnosis and management of assessed problem(s) [FreeTextEntry1] : \par \par 51 y/o female with h/o hypothyroid, endometriosis, obesity, hl who presents for initial evaluation today.\par \par -ordered ekg today - nsr, normal intervals, no st/t changes\par -labs 2023 reviewed\par -ordered calcium score \par -ordered echo for murmur\par -counseled on cvd risk factors\par -endo f/up for obesity, levothyroxine\par -f/up 3-4 months for cvd risk factors\par \par I have spent 60 minutes reviewing labs, records, tests and discussed cvd risk factors, hl, obesity

## 2023-06-05 NOTE — HISTORY OF PRESENT ILLNESS
[FreeTextEntry1] : \par \par 51 y/o female with h/o hypothyroid, endometriosis, obesity, hl who presents for initial evaluation today.\par \par \par no cp, sob, syncope, lh, orthopnea, pnd\par notes rare palpitations, edema\par \par walks for exercise\par \par PMH/PSH:\par hypothyroid\par osteopenia\par vit D deficiency\par obesity\par lyme disease\par lumbar disc disease\par uterine polyp removal\par endometriosis\par sinus surgery\par hl\par \par \par ALL:\par nkda\par \par \par MEDS:\par vit D\par levothyroxine 88 mcg qd\par metformin 1500 mg qd\par \par \par SH:\par no tobacco\par social etoh\par no drugs\par \par from [facility]\[place]\par 4 children \par worked maintenance, \par \par FH:\par mother -  MI 87\par father -  liver cancer 94\par sister -  ovarian cancer 63\par 8 siblings - alive healthy

## 2023-06-05 NOTE — PHYSICAL EXAM
[Well Developed] : well developed [Well Nourished] : well nourished [No Acute Distress] : no acute distress [Normal Conjunctiva] : normal conjunctiva [Normal Venous Pressure] : normal venous pressure [No Carotid Bruit] : no carotid bruit [Normal S1, S2] : normal S1, S2 [No Rub] : no rub [No Gallop] : no gallop [Clear Lung Fields] : clear lung fields [Good Air Entry] : good air entry [No Respiratory Distress] : no respiratory distress  [Soft] : abdomen soft [Non Tender] : non-tender [No Masses/organomegaly] : no masses/organomegaly [Normal Bowel Sounds] : normal bowel sounds [Normal Gait] : normal gait [No Edema] : no edema [No Cyanosis] : no cyanosis [No Clubbing] : no clubbing [No Varicosities] : no varicosities [No Rash] : no rash [No Skin Lesions] : no skin lesions [Moves all extremities] : moves all extremities [No Focal Deficits] : no focal deficits [Normal Speech] : normal speech [Alert and Oriented] : alert and oriented [Normal memory] : normal memory [de-identified] : 2/6 luz michael

## 2023-06-15 ENCOUNTER — RX RENEWAL (OUTPATIENT)
Age: 53
End: 2023-06-15

## 2023-06-22 ENCOUNTER — APPOINTMENT (OUTPATIENT)
Dept: CT IMAGING | Facility: HOSPITAL | Age: 53
End: 2023-06-22

## 2023-06-22 ENCOUNTER — OUTPATIENT (OUTPATIENT)
Dept: OUTPATIENT SERVICES | Facility: HOSPITAL | Age: 53
LOS: 1 days | End: 2023-06-22
Payer: SELF-PAY

## 2023-06-22 PROCEDURE — 75571 CT HRT W/O DYE W/CA TEST: CPT | Mod: 26

## 2023-06-22 PROCEDURE — 75571 CT HRT W/O DYE W/CA TEST: CPT

## 2023-07-13 ENCOUNTER — APPOINTMENT (OUTPATIENT)
Dept: HEART AND VASCULAR | Facility: CLINIC | Age: 53
End: 2023-07-13

## 2023-09-26 ENCOUNTER — APPOINTMENT (OUTPATIENT)
Dept: ENDOCRINOLOGY | Facility: CLINIC | Age: 53
End: 2023-09-26
Payer: COMMERCIAL

## 2023-09-26 VITALS
BODY MASS INDEX: 30.9 KG/M2 | DIASTOLIC BLOOD PRESSURE: 71 MMHG | WEIGHT: 180 LBS | HEART RATE: 62 BPM | SYSTOLIC BLOOD PRESSURE: 133 MMHG

## 2023-09-26 DIAGNOSIS — E55.9 VITAMIN D DEFICIENCY, UNSPECIFIED: ICD-10-CM

## 2023-09-26 PROCEDURE — 99214 OFFICE O/P EST MOD 30 MIN: CPT

## 2023-09-26 RX ORDER — ESTRADIOL/NORETHINDRONE ACETATE TRANSDERMAL SYSTEM .05; .25 MG/D; MG/D
PATCH, EXTENDED RELEASE TRANSDERMAL
Refills: 0 | Status: ACTIVE | COMMUNITY

## 2023-09-26 RX ORDER — METFORMIN ER 750 MG 750 MG/1
750 TABLET ORAL
Qty: 180 | Refills: 2 | Status: DISCONTINUED | COMMUNITY
Start: 2023-05-17 | End: 2023-09-26

## 2023-09-26 RX ORDER — SEMAGLUTIDE 0.5 MG/.5ML
0.5 INJECTION, SOLUTION SUBCUTANEOUS
Qty: 1 | Refills: 4 | Status: ACTIVE | COMMUNITY
Start: 2023-09-26 | End: 1900-01-01

## 2023-09-26 RX ORDER — SEMAGLUTIDE 0.25 MG/.5ML
0.25 INJECTION, SOLUTION SUBCUTANEOUS
Qty: 1 | Refills: 0 | Status: COMPLETED | COMMUNITY
Start: 2023-09-26 | End: 2023-10-24

## 2023-09-26 RX ORDER — CYCLOBENZAPRINE HYDROCHLORIDE 5 MG/1
5 TABLET, FILM COATED ORAL
Qty: 20 | Refills: 2 | Status: DISCONTINUED | COMMUNITY
Start: 2022-02-08 | End: 2023-09-26

## 2024-01-11 ENCOUNTER — APPOINTMENT (OUTPATIENT)
Dept: INTERNAL MEDICINE | Facility: CLINIC | Age: 54
End: 2024-01-11
Payer: COMMERCIAL

## 2024-01-11 VITALS
TEMPERATURE: 97.5 F | SYSTOLIC BLOOD PRESSURE: 121 MMHG | HEART RATE: 73 BPM | OXYGEN SATURATION: 97 % | WEIGHT: 188 LBS | BODY MASS INDEX: 32.1 KG/M2 | DIASTOLIC BLOOD PRESSURE: 79 MMHG | HEIGHT: 64 IN

## 2024-01-11 DIAGNOSIS — M54.2 CERVICALGIA: ICD-10-CM

## 2024-01-11 LAB
BASOPHILS # BLD AUTO: 0.02 K/UL
BASOPHILS NFR BLD AUTO: 0.4 %
EOSINOPHIL # BLD AUTO: 0.3 K/UL
EOSINOPHIL NFR BLD AUTO: 5.8 %
HCT VFR BLD CALC: 37.8 %
HGB BLD-MCNC: 12.4 G/DL
IMM GRANULOCYTES NFR BLD AUTO: 0.2 %
LYMPHOCYTES # BLD AUTO: 1.91 K/UL
LYMPHOCYTES NFR BLD AUTO: 36.7 %
MAN DIFF?: NORMAL
MCHC RBC-ENTMCNC: 29.3 PG
MCHC RBC-ENTMCNC: 32.8 GM/DL
MCV RBC AUTO: 89.4 FL
MONOCYTES # BLD AUTO: 0.36 K/UL
MONOCYTES NFR BLD AUTO: 6.9 %
NEUTROPHILS # BLD AUTO: 2.6 K/UL
NEUTROPHILS NFR BLD AUTO: 50 %
PLATELET # BLD AUTO: 221 K/UL
RBC # BLD: 4.23 M/UL
RBC # FLD: 12.7 %
WBC # FLD AUTO: 5.2 K/UL

## 2024-01-11 PROCEDURE — 36415 COLL VENOUS BLD VENIPUNCTURE: CPT

## 2024-01-11 PROCEDURE — 99204 OFFICE O/P NEW MOD 45 MIN: CPT | Mod: 25

## 2024-01-11 RX ORDER — CYCLOBENZAPRINE HYDROCHLORIDE 10 MG/1
10 TABLET, FILM COATED ORAL
Qty: 10 | Refills: 0 | Status: ACTIVE | COMMUNITY
Start: 2024-01-11 | End: 1900-01-01

## 2024-01-11 RX ORDER — GABAPENTIN 300 MG/1
300 CAPSULE ORAL
Qty: 90 | Refills: 0 | Status: ACTIVE | COMMUNITY
Start: 2024-01-11 | End: 1900-01-01

## 2024-01-11 RX ORDER — NAPROXEN 500 MG/1
500 TABLET ORAL
Qty: 20 | Refills: 3 | Status: ACTIVE | COMMUNITY
Start: 2022-02-08 | End: 1900-01-01

## 2024-01-11 RX ORDER — DICLOFENAC SODIUM 1% 10 MG/G
1 GEL TOPICAL
Qty: 1 | Refills: 2 | Status: ACTIVE | COMMUNITY
Start: 2024-01-11 | End: 1900-01-01

## 2024-01-11 NOTE — REVIEW OF SYSTEMS
[Fever] : no fever [Chills] : no chills [Fatigue] : no fatigue [Night Sweats] : no night sweats [Recent Change In Weight] : ~T no recent weight change [Chest Pain] : no chest pain [Palpitations] : no palpitations [Lower Ext Edema] : no lower extremity edema [Orthopnea] : no orthopnea [Shortness Of Breath] : no shortness of breath [Wheezing] : no wheezing [Cough] : no cough [Dyspnea on Exertion] : no dyspnea on exertion [Abdominal Pain] : no abdominal pain [Nausea] : no nausea [Constipation] : no constipation [Diarrhea] : diarrhea [Vomiting] : no vomiting [Heartburn] : heartburn [Dysuria] : no dysuria [Incontinence] : no incontinence [Frequency] : no frequency [Joint Pain] : joint pain [Muscle Pain] : muscle pain

## 2024-01-11 NOTE — HISTORY OF PRESENT ILLNESS
[de-identified] : Ms. MORELAND is a53 year F with PMH of hypothyroidism, obesity and plantar fasciitis who comes for follow up. Today pt c/o right sided neck and arm pain, started 2 months ago and has been getting worse, refer a burning sensation going down to the arm, has been very difficult to sleep due to the pain, previously she had MRIs showing cervical radiculopathy.

## 2024-01-12 LAB
25(OH)D3 SERPL-MCNC: 41.5 NG/ML
ALBUMIN SERPL ELPH-MCNC: 4.5 G/DL
ALP BLD-CCNC: 79 U/L
ALT SERPL-CCNC: 22 U/L
ANA SER IF-ACNC: NEGATIVE
ANION GAP SERPL CALC-SCNC: 12 MMOL/L
AST SERPL-CCNC: 18 U/L
BILIRUB SERPL-MCNC: 0.5 MG/DL
BUN SERPL-MCNC: 20 MG/DL
CALCIUM SERPL-MCNC: 9 MG/DL
CHLORIDE SERPL-SCNC: 106 MMOL/L
CHOLEST SERPL-MCNC: 220 MG/DL
CO2 SERPL-SCNC: 22 MMOL/L
CREAT SERPL-MCNC: 0.73 MG/DL
CRP SERPL-MCNC: <3 MG/L
EGFR: 99 ML/MIN/1.73M2
ERYTHROCYTE [SEDIMENTATION RATE] IN BLOOD BY WESTERGREN METHOD: 20 MM/HR
GLUCOSE SERPL-MCNC: 98 MG/DL
HDLC SERPL-MCNC: 68 MG/DL
LDLC SERPL CALC-MCNC: 140 MG/DL
NONHDLC SERPL-MCNC: 152 MG/DL
POTASSIUM SERPL-SCNC: 4.2 MMOL/L
PROT SERPL-MCNC: 6.6 G/DL
SODIUM SERPL-SCNC: 140 MMOL/L
TRIGL SERPL-MCNC: 68 MG/DL

## 2024-01-16 ENCOUNTER — NON-APPOINTMENT (OUTPATIENT)
Age: 54
End: 2024-01-16

## 2024-01-17 ENCOUNTER — APPOINTMENT (OUTPATIENT)
Dept: ORTHOPEDIC SURGERY | Facility: CLINIC | Age: 54
End: 2024-01-17
Payer: COMMERCIAL

## 2024-01-17 VITALS
HEIGHT: 64 IN | SYSTOLIC BLOOD PRESSURE: 116 MMHG | WEIGHT: 188 LBS | BODY MASS INDEX: 32.1 KG/M2 | DIASTOLIC BLOOD PRESSURE: 73 MMHG | HEART RATE: 64 BPM

## 2024-01-17 DIAGNOSIS — M54.2 CERVICALGIA: ICD-10-CM

## 2024-01-17 PROCEDURE — 99203 OFFICE O/P NEW LOW 30 MIN: CPT

## 2024-01-17 PROCEDURE — 72040 X-RAY EXAM NECK SPINE 2-3 VW: CPT

## 2024-01-17 NOTE — DISCUSSION/SUMMARY
[de-identified] : We discussed further treatment options.  Given her failure to improve with therapy I recommend a cervical MRI.  Follow-up afterwards.

## 2024-01-17 NOTE — PHYSICAL EXAM
[Ataxic] : not ataxic [de-identified] : Examination of the cervical spine reveals no midline or paraspinal tenderness to palpation. No cervical lymphadenopathy. Decreased range of motion with respect to flexion, extension, rotation, and lateral bending. Negative Spurlings. Negative Lhermitte's. Full range of motion bilateral shoulders without evidence of impingement. No instability of bilateral upper extremities.  Cranial nerves II through XII grossly intact. Intact sensation bilateral upper extremities. 5/5 deltoids biceps triceps wrist extensors wrist flexors finger flexors and hand intrinsics. 1+ biceps triceps and brachioradialis reflexes. Negative Espino's. 2+ radial pulse. Negative Tinel's over the cubital and carpal tunnel. No skin lesions on the right and left upper extremities. [de-identified] : AP lateral cervical x-rays with some spondylosis without instability or aggressive lesions

## 2024-01-17 NOTE — HISTORY OF PRESENT ILLNESS
[de-identified] : Ms. ANDREZ MORELAND  is a 53 year old female who presents with 3 months of neck and right arm pain without any specific cause or trauma.  Her right has pain and weakness.  She did 6 weeks of PT ordered by her medical MD without any improvement.  Normal bowel and bladder control.   Denies any recent fevers, chills, sweats, weight loss, or infection.  She has been taking advil with minimal relief.   The patients past medical history, past surgical history, medications, allergies, and social history were reviewed by me today with the patient and documented accordingly.  In addition, the patient's family history, which is noncontributory to their visit, was also reviewed.

## 2024-01-18 ENCOUNTER — APPOINTMENT (OUTPATIENT)
Dept: ENDOCRINOLOGY | Facility: CLINIC | Age: 54
End: 2024-01-18
Payer: COMMERCIAL

## 2024-01-18 VITALS
DIASTOLIC BLOOD PRESSURE: 72 MMHG | SYSTOLIC BLOOD PRESSURE: 123 MMHG | HEART RATE: 74 BPM | BODY MASS INDEX: 31.58 KG/M2 | WEIGHT: 185 LBS | HEIGHT: 64 IN

## 2024-01-18 DIAGNOSIS — E03.9 HYPOTHYROIDISM, UNSPECIFIED: ICD-10-CM

## 2024-01-18 DIAGNOSIS — E66.9 OBESITY, UNSPECIFIED: ICD-10-CM

## 2024-01-18 DIAGNOSIS — M85.80 OTHER SPECIFIED DISORDERS OF BONE DENSITY AND STRUCTURE, UNSPECIFIED SITE: ICD-10-CM

## 2024-01-18 PROCEDURE — 99214 OFFICE O/P EST MOD 30 MIN: CPT

## 2024-01-18 NOTE — ASSESSMENT
[FreeTextEntry1] : Hypothyroidism. She has been biochemically euthyroid on her current dose of levothyroxine. We have reviewed proper use and compliance with levothyroxine. Continue levothyroxine 88 mcg daily  Elevated body mass index. We have reviewed lifestyle modifications for weight loss. She has declined follow-up with nutrition. We discussed pharmacologic options for weight loss. Metformin was ineffective for weight loss. She is amenable to a trial of a GLP-1 receptor agonist. We discussed the risks and benefits of the GLP-1 receptor agonist class, including but not limited to nausea, pancreatitis, medullary thyroid cancer. We reviewed subcutaneous injection technique, storage, and sharps disposal. Lifestyle modification Start semaglutide 0.25 mg weekly for four weeks, then 0.5 mg weekly (sample for Ozempic given)  Osteopenia/vitamin D deficiency. She has been noted to have osteopenia at the spine with normal values at the hip. The report from [facility] incorrectly excluded L2, which would then put the value in the osteoporosis range. Her 10 year fracture risk calculated by FRAX was 8.2% for major osteoporotic fracture and 0.2% for hip fracture, below the treatment thresholds. Interval bone density testing in June 2025 Calcium 1000 mg daily from diet (to be taken in divided doses as no more than 500-600 mg can be absorbed at one time); recommended increased dietary and/or supplemental calcium Continue current vitamin D regimen  Return to see me in 4 months or earlier as needed.

## 2024-01-18 NOTE — HISTORY OF PRESENT ILLNESS
[FreeTextEntry1] : Ms. Arroyo is a 53 year-old woman with a history of hypothyroidism, osteopenia, and vitamin D deficiency presenting for follow-up of her endocrine issues. I saw her for an initial visit in 2018 and last in 2023.  Hypothyroidism. She was diagnosed with hypothyroidism in . She has been taking levothyroxine 88 mcg daily for many years. She is taking levothyroxine in the morning, on an empty stomach, with plain water, and waiting at least 30 minutes before eating. She is not taking calcium/iron/multivitamin.  No history of radiation exposure. Sister with history of hypothyroidism (now ).   Osteopenia/vitamin D deficiency. Menopause: NA Osteopenia diagnosed in  (report not available); most recent bone density as below Fracture history: None Family history: No parental history of hip fracture Treatment: None  Falls: No Height loss: No Kidney stones: "Sand" in her kidney diagnosed a few years ago Dental health: Regular appointments; no issues Exercise: Active at work; no formal regimen Dairy intake: 1/2-1 serving daily (milk with coffee, yogurt and cheese most days) Calcium supplements: None Multivitamin: None Vitamin D supplements: 2000 intl units daily  Osteoporosis risk factors include: Postmenopausal status,  race, prior fracture, falls, height loss, small thin bones, tobacco use, excessive alcohol, anorexia, family history, vitamin D deficiency, corticosteroid use, seizure medications, malabsorption, hyperparathyroidism, hyperthyroidism. NEGATIVE EXCEPT: Postmenopausal status,  race  Elevated body mass index.  Her highest weight was 180 pounds. She has followed with a nutritionist in the past. Metformin was ineffective for weight loss.  Interim History  She has had vertigo. She has been noted to have fluid behind her eardrum. She was prescribed a short steroid burst. She has discontinued metformin.  Wegovy was approved by insurance, but she has not been able to obtain due to the back order.  She has seen multiple providers; notes reviewed. Last laboratory results reviewed.  Medical and surgical history, medications, allergies, social and family history reviewed and updated as needed.

## 2024-01-18 NOTE — DATA REVIEWED
[FreeTextEntry1] : Most recent bone mineral density Date: May 19, 2023 Source: [facility] Site: [facility]  Site	BMD	T-score	Change previous	Change baseline	 Lumbar spine	0.787	-2.4			 Femoral neck	0.847	0.0			 Total hip          0.967	+0.2			 Distal radius	0.653	-0.7			 DXA comments: Baseline scan at this facility; left hip values Vertebral fracture assessment without evidence of compression fractures T8-L5 (my read).  Impression: I have personally reviewed the DXA images and report. The report from [facility] incorrectly excluded L2, which would then put the value in the osteoporosis range. There is osteopenia by the World Health Organization criteria. Vertebral fracture assessment without evidence of compression fractures.

## 2024-01-19 ENCOUNTER — NON-APPOINTMENT (OUTPATIENT)
Age: 54
End: 2024-01-19

## 2024-02-01 ENCOUNTER — APPOINTMENT (OUTPATIENT)
Dept: HEART AND VASCULAR | Facility: CLINIC | Age: 54
End: 2024-02-01

## 2024-02-02 ENCOUNTER — APPOINTMENT (OUTPATIENT)
Dept: HEART AND VASCULAR | Facility: CLINIC | Age: 54
End: 2024-02-02
Payer: COMMERCIAL

## 2024-02-02 VITALS
BODY MASS INDEX: 31.58 KG/M2 | DIASTOLIC BLOOD PRESSURE: 82 MMHG | WEIGHT: 185 LBS | OXYGEN SATURATION: 98 % | SYSTOLIC BLOOD PRESSURE: 144 MMHG | HEIGHT: 64 IN | HEART RATE: 60 BPM

## 2024-02-02 DIAGNOSIS — R01.1 CARDIAC MURMUR, UNSPECIFIED: ICD-10-CM

## 2024-02-02 PROCEDURE — 93306 TTE W/DOPPLER COMPLETE: CPT

## 2024-02-03 PROBLEM — R01.1 MURMUR, CARDIAC: Status: ACTIVE | Noted: 2024-02-03

## 2024-02-05 ENCOUNTER — APPOINTMENT (OUTPATIENT)
Dept: HEART AND VASCULAR | Facility: CLINIC | Age: 54
End: 2024-02-05

## 2024-02-16 ENCOUNTER — APPOINTMENT (OUTPATIENT)
Age: 54
End: 2024-02-16
Payer: COMMERCIAL

## 2024-02-16 VITALS
BODY MASS INDEX: 32.1 KG/M2 | HEART RATE: 60 BPM | OXYGEN SATURATION: 97 % | WEIGHT: 188 LBS | HEIGHT: 64 IN | DIASTOLIC BLOOD PRESSURE: 83 MMHG | SYSTOLIC BLOOD PRESSURE: 133 MMHG

## 2024-02-16 DIAGNOSIS — M54.12 RADICULOPATHY, CERVICAL REGION: ICD-10-CM

## 2024-02-16 DIAGNOSIS — M48.02 SPINAL STENOSIS, CERVICAL REGION: ICD-10-CM

## 2024-02-16 PROCEDURE — 99214 OFFICE O/P EST MOD 30 MIN: CPT

## 2024-02-16 NOTE — DISCUSSION/SUMMARY
[de-identified] : We reviewed her MRI.  We discussed further treatment options.  She wished to continue with conservative measures.  She will try some additional physical therapy as well as be evaluated for epidural injections.  Referrals were given.  She will let me know of any changes or worsening of her symptoms.

## 2024-02-16 NOTE — HISTORY OF PRESENT ILLNESS
[de-identified] : Ms. ANDREZ MORELAND  is a 53 year old female who presents to the office for a follow-up visit.  She is here to review her MRI results.

## 2024-02-16 NOTE — PHYSICAL EXAM
[Ataxic] : not ataxic [de-identified] : Examination of the cervical spine reveals no midline or paraspinal tenderness to palpation. No cervical lymphadenopathy. Decreased range of motion with respect to flexion, extension, rotation, and lateral bending. Negative Spurlings. Negative Lhermitte's. Full range of motion bilateral shoulders without evidence of impingement. No instability of bilateral upper extremities.  Cranial nerves II through XII grossly intact. Intact sensation bilateral upper extremities. 5/5 deltoids biceps triceps wrist extensors wrist flexors finger flexors and hand intrinsics. 1+ biceps triceps and brachioradialis reflexes. Negative Espino's. 2+ radial pulse. Negative Tinel's over the cubital and carpal tunnel. No skin lesions on the right and left upper extremities. [de-identified] : AP lateral cervical x-rays with some spondylosis without instability or aggressive lesions

## 2024-02-20 ENCOUNTER — APPOINTMENT (OUTPATIENT)
Dept: INTERNAL MEDICINE | Facility: CLINIC | Age: 54
End: 2024-02-20

## 2024-02-21 ENCOUNTER — TRANSCRIPTION ENCOUNTER (OUTPATIENT)
Age: 54
End: 2024-02-21

## 2024-02-28 ENCOUNTER — APPOINTMENT (OUTPATIENT)
Dept: GASTROENTEROLOGY | Facility: CLINIC | Age: 54
End: 2024-02-28
Payer: COMMERCIAL

## 2024-02-28 DIAGNOSIS — R10.9 UNSPECIFIED ABDOMINAL PAIN: ICD-10-CM

## 2024-02-28 DIAGNOSIS — R11.10 VOMITING, UNSPECIFIED: ICD-10-CM

## 2024-02-28 DIAGNOSIS — Z12.11 ENCOUNTER FOR SCREENING FOR MALIGNANT NEOPLASM OF COLON: ICD-10-CM

## 2024-02-28 PROCEDURE — 99204 OFFICE O/P NEW MOD 45 MIN: CPT

## 2024-02-28 NOTE — PHYSICAL EXAM
Dr. Alf Sanchez [Bowel Sounds] : normal bowel sounds [Abdomen Tenderness] : non-tender [Abdomen Soft] : soft [No Masses] : no abdominal mass palpated [Abnormal Walk] : normal gait [Normal Color / Pigmentation] : normal skin color and pigmentation [] : no rash [Normal Turgor] : normal skin turgor [No Focal Deficits] : no focal deficits [Normal] : oriented to person, place, and time [Normal Affect] : the affect was normal [Oriented To Time, Place, And Person] : oriented to person, place, and time [Normal Mood] : the mood was normal

## 2024-02-29 PROBLEM — R11.10 VOMITING: Status: ACTIVE | Noted: 2024-02-29

## 2024-02-29 PROBLEM — R10.9 ABDOMINAL PAIN IN FEMALE: Status: ACTIVE | Noted: 2024-02-29

## 2024-02-29 NOTE — ASSESSMENT
[FreeTextEntry1] : 53F with PMHx hypothyroidism and obesity referred by Dr. Ozuna for colon cancer screening.   Colon cancer screening  - schedule patient for colonoscopy @ [facility], r/a/i/b discussed and patient agreeable  - bowel prep instructions to be provided, 2 day MiraLAX prep. pt endorses cannot drink large volume prep therefore opted against GOLYTELY - informed patient escort must pick patient up from procedure  - CBC/CMP in chart  Occasional abdominal pain/vomiting - very infrequent therefore advise again EGD at this time - advised pt to contact office if symptoms worsen/increase in frequency and can discuss next steps  f/u post colonoscopy

## 2024-02-29 NOTE — HISTORY OF PRESENT ILLNESS
[FreeTextEntry1] : 53F with PMHx hypothyroidism and obesity referred by Dr. Ozuna for colon cancer screening.   HPI- no GI issues on regular basis  3 times a year has episodes of vomiting with abdominal pain  denies dysphagia, heartburn or regurgitation   [place] stool score- normal BMs  Colon cancer screening- ~ 5 years ago, was told to repeat in 1 year due to poor prep    PMHx- see HPI PSHx- none  Rx- see chart  Supplements/herbs/OTC- none A/c or NSAIDs? naproxen as needed for pinched nerve  has not started wegovy yet but thinking about it  FHx- no GI cancers  Allergies- NKDA ETOH- social  Smoking- none  Drugs- none   Labs/stool tests- CBC/CMP wnl  Breath tests- none Imaging- [place] 2019 EGD- never Colonoscopy- 7/2020, recommended repeat in 1 year due to poor prep

## 2024-02-29 NOTE — REASON FOR VISIT
[Home] : at home, [unfilled] , at the time of the visit. [Medical Office: ([facility])___] : at the medical office located in  [Patient] : the patient [Initial Evaluation] : an initial evaluation [FreeTextEntry2] : Emily Arroyo  [FreeTextEntry1] : colon cancer screening

## 2024-03-20 ENCOUNTER — NON-APPOINTMENT (OUTPATIENT)
Age: 54
End: 2024-03-20

## 2024-03-21 ENCOUNTER — APPOINTMENT (OUTPATIENT)
Age: 54
End: 2024-03-21
Payer: COMMERCIAL

## 2024-03-21 PROCEDURE — 45378 DIAGNOSTIC COLONOSCOPY: CPT | Mod: 33

## 2024-06-17 ENCOUNTER — APPOINTMENT (OUTPATIENT)
Dept: ENDOCRINOLOGY | Facility: CLINIC | Age: 54
End: 2024-06-17

## 2024-07-08 ENCOUNTER — NON-APPOINTMENT (OUTPATIENT)
Age: 54
End: 2024-07-08

## 2024-07-09 ENCOUNTER — APPOINTMENT (OUTPATIENT)
Dept: INTERNAL MEDICINE | Facility: CLINIC | Age: 54
End: 2024-07-09
Payer: COMMERCIAL

## 2024-07-09 VITALS
OXYGEN SATURATION: 98 % | BODY MASS INDEX: 31.12 KG/M2 | HEIGHT: 64 IN | TEMPERATURE: 97.9 F | DIASTOLIC BLOOD PRESSURE: 86 MMHG | SYSTOLIC BLOOD PRESSURE: 129 MMHG | HEART RATE: 66 BPM | WEIGHT: 182.25 LBS

## 2024-07-09 DIAGNOSIS — Z00.00 ENCOUNTER FOR GENERAL ADULT MEDICAL EXAMINATION W/OUT ABNORMAL FINDINGS: ICD-10-CM

## 2024-07-09 DIAGNOSIS — Z23 ENCOUNTER FOR IMMUNIZATION: ICD-10-CM

## 2024-07-09 DIAGNOSIS — R53.83 OTHER FATIGUE: ICD-10-CM

## 2024-07-09 DIAGNOSIS — K76.89 OTHER SPECIFIED DISEASES OF LIVER: ICD-10-CM

## 2024-07-09 DIAGNOSIS — E78.5 HYPERLIPIDEMIA, UNSPECIFIED: ICD-10-CM

## 2024-07-09 DIAGNOSIS — M85.80 OTHER SPECIFIED DISORDERS OF BONE DENSITY AND STRUCTURE, UNSPECIFIED SITE: ICD-10-CM

## 2024-07-09 DIAGNOSIS — E03.9 HYPOTHYROIDISM, UNSPECIFIED: ICD-10-CM

## 2024-07-09 PROCEDURE — 99396 PREV VISIT EST AGE 40-64: CPT | Mod: 25

## 2024-07-09 PROCEDURE — 90472 IMMUNIZATION ADMIN EACH ADD: CPT

## 2024-07-09 PROCEDURE — 99213 OFFICE O/P EST LOW 20 MIN: CPT | Mod: 25

## 2024-07-09 PROCEDURE — 36415 COLL VENOUS BLD VENIPUNCTURE: CPT

## 2024-07-09 PROCEDURE — 90715 TDAP VACCINE 7 YRS/> IM: CPT

## 2024-07-09 PROCEDURE — 90471 IMMUNIZATION ADMIN: CPT

## 2024-07-09 PROCEDURE — G0444 DEPRESSION SCREEN ANNUAL: CPT | Mod: 59

## 2024-07-09 PROCEDURE — 90750 HZV VACC RECOMBINANT IM: CPT

## 2024-07-11 LAB
25(OH)D3 SERPL-MCNC: 49.1 NG/ML
ALBUMIN SERPL ELPH-MCNC: 4.5 G/DL
ALP BLD-CCNC: 79 U/L
ALT SERPL-CCNC: 26 U/L
ANION GAP SERPL CALC-SCNC: 17 MMOL/L
AST SERPL-CCNC: 25 U/L
BASOPHILS # BLD AUTO: 0.02 K/UL
BASOPHILS NFR BLD AUTO: 0.4 %
BILIRUB SERPL-MCNC: 0.5 MG/DL
BUN SERPL-MCNC: 14 MG/DL
CALCIUM SERPL-MCNC: 9.6 MG/DL
CO2 SERPL-SCNC: 23 MMOL/L
CREAT SERPL-MCNC: 0.74 MG/DL
EGFR: 97 ML/MIN/1.73M2
EOSINOPHIL # BLD AUTO: 0.37 K/UL
EOSINOPHIL NFR BLD AUTO: 7.1 %
ESTIMATED AVERAGE GLUCOSE: 114 MG/DL
FERRITIN SERPL-MCNC: 108 NG/ML
FOLATE SERPL-MCNC: 14.8 NG/ML
HBA1C MFR BLD HPLC: 5.6 %
HCT VFR BLD CALC: 37.1 %
HDLC SERPL-MCNC: 59 MG/DL
HGB BLD-MCNC: 12.2 G/DL
IMM GRANULOCYTES NFR BLD AUTO: 0.4 %
LDLC SERPL CALC-MCNC: 153 MG/DL
LYMPHOCYTES # BLD AUTO: 1.89 K/UL
LYMPHOCYTES NFR BLD AUTO: 36.4 %
MAN DIFF?: NORMAL
MCHC RBC-ENTMCNC: 29 PG
MCHC RBC-ENTMCNC: 32.9 GM/DL
MCV RBC AUTO: 88.1 FL
MONOCYTES NFR BLD AUTO: 7.1 %
NEUTROPHILS # BLD AUTO: 2.52 K/UL
NEUTROPHILS NFR BLD AUTO: 48.6 %
NONHDLC SERPL-MCNC: 166 MG/DL
NT-PROBNP SERPL-MCNC: 74 PG/ML
PROT SERPL-MCNC: 6.6 G/DL
RBC # BLD: 4.21 M/UL
RBC # FLD: 13.3 %
SODIUM SERPL-SCNC: 145 MMOL/L
TRIGL SERPL-MCNC: 73 MG/DL
TSH SERPL-ACNC: 1.55 UIU/ML
VIT B12 SERPL-MCNC: 444 PG/ML

## 2024-08-16 ENCOUNTER — RX RENEWAL (OUTPATIENT)
Age: 54
End: 2024-08-16

## 2024-08-21 ENCOUNTER — NON-APPOINTMENT (OUTPATIENT)
Age: 54
End: 2024-08-21

## 2024-08-29 ENCOUNTER — APPOINTMENT (OUTPATIENT)
Dept: DERMATOLOGY | Facility: CLINIC | Age: 54
End: 2024-08-29

## 2024-10-30 ENCOUNTER — APPOINTMENT (OUTPATIENT)
Dept: HEART AND VASCULAR | Facility: CLINIC | Age: 54
End: 2024-10-30
Payer: COMMERCIAL

## 2024-10-30 VITALS
TEMPERATURE: 97.3 F | HEART RATE: 69 BPM | OXYGEN SATURATION: 98 % | BODY MASS INDEX: 31.58 KG/M2 | HEIGHT: 64 IN | DIASTOLIC BLOOD PRESSURE: 89 MMHG | WEIGHT: 185 LBS | SYSTOLIC BLOOD PRESSURE: 148 MMHG

## 2024-10-30 VITALS — SYSTOLIC BLOOD PRESSURE: 134 MMHG | DIASTOLIC BLOOD PRESSURE: 72 MMHG

## 2024-10-30 DIAGNOSIS — R06.09 OTHER FORMS OF DYSPNEA: ICD-10-CM

## 2024-10-30 DIAGNOSIS — R07.9 CHEST PAIN, UNSPECIFIED: ICD-10-CM

## 2024-10-30 DIAGNOSIS — I10 ESSENTIAL (PRIMARY) HYPERTENSION: ICD-10-CM

## 2024-10-30 PROCEDURE — 36415 COLL VENOUS BLD VENIPUNCTURE: CPT

## 2024-10-30 PROCEDURE — 93000 ELECTROCARDIOGRAM COMPLETE: CPT

## 2024-10-30 PROCEDURE — 99215 OFFICE O/P EST HI 40 MIN: CPT | Mod: 25

## 2024-10-30 RX ORDER — AMLODIPINE BESYLATE 2.5 MG/1
2.5 TABLET ORAL
Qty: 90 | Refills: 1 | Status: ACTIVE | COMMUNITY
Start: 2024-10-30 | End: 1900-01-01

## 2024-10-30 RX ORDER — ATORVASTATIN CALCIUM 10 MG/1
10 TABLET, FILM COATED ORAL
Qty: 90 | Refills: 1 | Status: ACTIVE | COMMUNITY
Start: 2024-10-30 | End: 1900-01-01

## 2024-10-31 LAB
CHOLEST SERPL-MCNC: 207 MG/DL
HDLC SERPL-MCNC: 68 MG/DL
LDLC SERPL CALC-MCNC: 130 MG/DL
NONHDLC SERPL-MCNC: 139 MG/DL
TRIGL SERPL-MCNC: 51 MG/DL

## 2024-11-03 LAB — APO LP(A) SERPL-MCNC: 34.6 NMOL/L

## 2024-11-05 ENCOUNTER — APPOINTMENT (OUTPATIENT)
Dept: DERMATOLOGY | Facility: CLINIC | Age: 54
End: 2024-11-05
Payer: COMMERCIAL

## 2024-11-05 DIAGNOSIS — Z12.83 ENCOUNTER FOR SCREENING FOR MALIGNANT NEOPLASM OF SKIN: ICD-10-CM

## 2024-11-05 DIAGNOSIS — D22.9 MELANOCYTIC NEVI, UNSPECIFIED: ICD-10-CM

## 2024-11-05 DIAGNOSIS — L72.11 PILAR CYST: ICD-10-CM

## 2024-11-05 DIAGNOSIS — D18.01 HEMANGIOMA OF SKIN AND SUBCUTANEOUS TISSUE: ICD-10-CM

## 2024-11-05 DIAGNOSIS — L82.1 OTHER SEBORRHEIC KERATOSIS: ICD-10-CM

## 2024-11-05 DIAGNOSIS — L82.0 INFLAMED SEBORRHEIC KERATOSIS: ICD-10-CM

## 2024-11-05 DIAGNOSIS — L81.4 OTHER MELANIN HYPERPIGMENTATION: ICD-10-CM

## 2024-11-05 PROCEDURE — 17110 DESTRUCTION B9 LES UP TO 14: CPT

## 2024-11-05 PROCEDURE — 99203 OFFICE O/P NEW LOW 30 MIN: CPT | Mod: 25

## 2024-11-06 ENCOUNTER — NON-APPOINTMENT (OUTPATIENT)
Age: 54
End: 2024-11-06

## 2024-11-08 ENCOUNTER — NON-APPOINTMENT (OUTPATIENT)
Age: 54
End: 2024-11-08

## 2024-11-26 ENCOUNTER — OUTPATIENT (OUTPATIENT)
Dept: OUTPATIENT SERVICES | Facility: HOSPITAL | Age: 54
LOS: 1 days | End: 2024-11-26
Payer: COMMERCIAL

## 2024-11-26 ENCOUNTER — APPOINTMENT (OUTPATIENT)
Dept: CT IMAGING | Facility: HOSPITAL | Age: 54
End: 2024-11-26

## 2024-11-26 PROCEDURE — 75574 CT ANGIO HRT W/3D IMAGE: CPT

## 2024-11-26 PROCEDURE — 82565 ASSAY OF CREATININE: CPT

## 2024-11-26 PROCEDURE — 75574 CT ANGIO HRT W/3D IMAGE: CPT | Mod: 26

## 2024-12-03 ENCOUNTER — APPOINTMENT (OUTPATIENT)
Dept: HEART AND VASCULAR | Facility: CLINIC | Age: 54
End: 2024-12-03
Payer: COMMERCIAL

## 2024-12-03 VITALS
HEART RATE: 73 BPM | WEIGHT: 185 LBS | SYSTOLIC BLOOD PRESSURE: 120 MMHG | HEIGHT: 64 IN | BODY MASS INDEX: 31.58 KG/M2 | DIASTOLIC BLOOD PRESSURE: 75 MMHG | OXYGEN SATURATION: 97 %

## 2024-12-03 DIAGNOSIS — R06.09 OTHER FORMS OF DYSPNEA: ICD-10-CM

## 2024-12-03 PROCEDURE — 93306 TTE W/DOPPLER COMPLETE: CPT

## 2024-12-03 PROCEDURE — 99214 OFFICE O/P EST MOD 30 MIN: CPT

## 2024-12-24 ENCOUNTER — APPOINTMENT (OUTPATIENT)
Dept: ENDOCRINOLOGY | Facility: CLINIC | Age: 54
End: 2024-12-24
Payer: COMMERCIAL

## 2024-12-24 VITALS
DIASTOLIC BLOOD PRESSURE: 82 MMHG | WEIGHT: 181 LBS | HEART RATE: 73 BPM | SYSTOLIC BLOOD PRESSURE: 144 MMHG | BODY MASS INDEX: 31.07 KG/M2

## 2024-12-24 DIAGNOSIS — M85.80 OTHER SPECIFIED DISORDERS OF BONE DENSITY AND STRUCTURE, UNSPECIFIED SITE: ICD-10-CM

## 2024-12-24 DIAGNOSIS — E03.9 HYPOTHYROIDISM, UNSPECIFIED: ICD-10-CM

## 2024-12-24 DIAGNOSIS — E66.811 OBESITY, CLASS 1: ICD-10-CM

## 2024-12-24 PROCEDURE — G2211 COMPLEX E/M VISIT ADD ON: CPT | Mod: NC

## 2024-12-24 PROCEDURE — 99214 OFFICE O/P EST MOD 30 MIN: CPT

## 2024-12-30 ENCOUNTER — NON-APPOINTMENT (OUTPATIENT)
Age: 54
End: 2024-12-30

## 2025-02-18 ENCOUNTER — APPOINTMENT (OUTPATIENT)
Dept: DERMATOLOGY | Facility: CLINIC | Age: 55
End: 2025-02-18

## 2025-02-25 ENCOUNTER — MED ADMIN CHARGE (OUTPATIENT)
Age: 55
End: 2025-02-25

## 2025-02-25 ENCOUNTER — APPOINTMENT (OUTPATIENT)
Dept: INTERNAL MEDICINE | Facility: CLINIC | Age: 55
End: 2025-02-25
Payer: COMMERCIAL

## 2025-02-25 ENCOUNTER — APPOINTMENT (OUTPATIENT)
Dept: HEART AND VASCULAR | Facility: CLINIC | Age: 55
End: 2025-02-25
Payer: COMMERCIAL

## 2025-02-25 VITALS
HEART RATE: 70 BPM | WEIGHT: 183 LBS | HEIGHT: 64 IN | BODY MASS INDEX: 31.24 KG/M2 | DIASTOLIC BLOOD PRESSURE: 75 MMHG | SYSTOLIC BLOOD PRESSURE: 124 MMHG | OXYGEN SATURATION: 97 % | TEMPERATURE: 97.2 F

## 2025-02-25 DIAGNOSIS — Z23 ENCOUNTER FOR IMMUNIZATION: ICD-10-CM

## 2025-02-25 DIAGNOSIS — E78.5 HYPERLIPIDEMIA, UNSPECIFIED: ICD-10-CM

## 2025-02-25 PROCEDURE — 90471 IMMUNIZATION ADMIN: CPT

## 2025-02-25 PROCEDURE — 99214 OFFICE O/P EST MOD 30 MIN: CPT | Mod: 25

## 2025-02-25 PROCEDURE — 36415 COLL VENOUS BLD VENIPUNCTURE: CPT

## 2025-02-25 PROCEDURE — 90750 HZV VACC RECOMBINANT IM: CPT

## 2025-02-26 LAB
ALBUMIN SERPL ELPH-MCNC: 4.3 G/DL
ALP BLD-CCNC: 83 U/L
ALT SERPL-CCNC: 18 U/L
ANION GAP SERPL CALC-SCNC: 11 MMOL/L
AST SERPL-CCNC: 18 U/L
BILIRUB SERPL-MCNC: 0.3 MG/DL
BUN SERPL-MCNC: 17 MG/DL
CALCIUM SERPL-MCNC: 9.4 MG/DL
CHLORIDE SERPL-SCNC: 106 MMOL/L
CO2 SERPL-SCNC: 26 MMOL/L
CREAT SERPL-MCNC: 0.71 MG/DL
EGFR: 102 ML/MIN/1.73M2
GLUCOSE SERPL-MCNC: 90 MG/DL
POTASSIUM SERPL-SCNC: 4.9 MMOL/L
PROT SERPL-MCNC: 6.2 G/DL
SODIUM SERPL-SCNC: 143 MMOL/L

## 2025-03-12 ENCOUNTER — APPOINTMENT (OUTPATIENT)
Dept: ENDOCRINOLOGY | Facility: CLINIC | Age: 55
End: 2025-03-12
Payer: COMMERCIAL

## 2025-03-12 VITALS
BODY MASS INDEX: 31.24 KG/M2 | SYSTOLIC BLOOD PRESSURE: 120 MMHG | HEART RATE: 71 BPM | WEIGHT: 182 LBS | DIASTOLIC BLOOD PRESSURE: 76 MMHG

## 2025-03-12 DIAGNOSIS — E66.811 OBESITY, CLASS 1: ICD-10-CM

## 2025-03-12 DIAGNOSIS — E03.9 HYPOTHYROIDISM, UNSPECIFIED: ICD-10-CM

## 2025-03-12 DIAGNOSIS — E78.5 HYPERLIPIDEMIA, UNSPECIFIED: ICD-10-CM

## 2025-03-12 PROCEDURE — 36415 COLL VENOUS BLD VENIPUNCTURE: CPT

## 2025-03-12 PROCEDURE — 99213 OFFICE O/P EST LOW 20 MIN: CPT

## 2025-03-14 LAB
ALBUMIN SERPL ELPH-MCNC: 4.5 G/DL
ALP BLD-CCNC: 81 U/L
ALT SERPL-CCNC: 16 U/L
ANION GAP SERPL CALC-SCNC: 10 MMOL/L
AST SERPL-CCNC: 17 U/L
BILIRUB SERPL-MCNC: 0.4 MG/DL
BUN SERPL-MCNC: 15 MG/DL
CALCIUM SERPL-MCNC: 9.7 MG/DL
CHLORIDE SERPL-SCNC: 105 MMOL/L
CHOLEST SERPL-MCNC: 221 MG/DL
CO2 SERPL-SCNC: 26 MMOL/L
CREAT SERPL-MCNC: 0.71 MG/DL
EGFRCR SERPLBLD CKD-EPI 2021: 102 ML/MIN/1.73M2
GLUCOSE SERPL-MCNC: 94 MG/DL
HDLC SERPL-MCNC: 71 MG/DL
LDLC SERPL CALC-MCNC: 138 MG/DL
NONHDLC SERPL-MCNC: 150 MG/DL
POTASSIUM SERPL-SCNC: 4.8 MMOL/L
PROT SERPL-MCNC: 6.6 G/DL
SODIUM SERPL-SCNC: 142 MMOL/L
T4 FREE SERPL-MCNC: 1.5 NG/DL
TRIGL SERPL-MCNC: 73 MG/DL
TSH SERPL-ACNC: 1.51 UIU/ML

## 2025-04-29 ENCOUNTER — RX RENEWAL (OUTPATIENT)
Age: 55
End: 2025-04-29

## 2025-06-18 ENCOUNTER — NON-APPOINTMENT (OUTPATIENT)
Age: 55
End: 2025-06-18

## 2025-06-20 ENCOUNTER — APPOINTMENT (OUTPATIENT)
Dept: ENDOCRINOLOGY | Facility: CLINIC | Age: 55
End: 2025-06-20
Payer: COMMERCIAL

## 2025-06-20 VITALS
DIASTOLIC BLOOD PRESSURE: 80 MMHG | HEART RATE: 72 BPM | WEIGHT: 171 LBS | BODY MASS INDEX: 29.35 KG/M2 | SYSTOLIC BLOOD PRESSURE: 124 MMHG

## 2025-06-20 PROBLEM — E66.811 OBESITY, CLASS I, BMI 30-34.9: Status: RESOLVED | Noted: 2017-01-04 | Resolved: 2025-06-20

## 2025-06-20 PROCEDURE — G2211 COMPLEX E/M VISIT ADD ON: CPT | Mod: NC

## 2025-06-20 PROCEDURE — 36415 COLL VENOUS BLD VENIPUNCTURE: CPT

## 2025-06-20 PROCEDURE — 99214 OFFICE O/P EST MOD 30 MIN: CPT

## 2025-06-23 LAB
25(OH)D3 SERPL-MCNC: 37.8 NG/ML
CHOLEST SERPL-MCNC: 213 MG/DL
HDLC SERPL-MCNC: 54 MG/DL
LDLC SERPL-MCNC: 142 MG/DL
NONHDLC SERPL-MCNC: 159 MG/DL
TRIGL SERPL-MCNC: 95 MG/DL
TSH SERPL-ACNC: 1.53 UIU/ML

## 2025-09-16 ENCOUNTER — APPOINTMENT (OUTPATIENT)
Dept: INTERNAL MEDICINE | Facility: CLINIC | Age: 55
End: 2025-09-16
Payer: COMMERCIAL

## 2025-09-16 ENCOUNTER — LABORATORY RESULT (OUTPATIENT)
Age: 55
End: 2025-09-16

## 2025-09-16 VITALS
OXYGEN SATURATION: 99 % | SYSTOLIC BLOOD PRESSURE: 132 MMHG | HEART RATE: 65 BPM | WEIGHT: 163 LBS | BODY MASS INDEX: 27.83 KG/M2 | HEIGHT: 64 IN | DIASTOLIC BLOOD PRESSURE: 85 MMHG

## 2025-09-16 DIAGNOSIS — M54.12 RADICULOPATHY, CERVICAL REGION: ICD-10-CM

## 2025-09-16 DIAGNOSIS — E78.5 HYPERLIPIDEMIA, UNSPECIFIED: ICD-10-CM

## 2025-09-16 DIAGNOSIS — Z00.00 ENCOUNTER FOR GENERAL ADULT MEDICAL EXAMINATION W/OUT ABNORMAL FINDINGS: ICD-10-CM

## 2025-09-16 PROCEDURE — 99396 PREV VISIT EST AGE 40-64: CPT

## 2025-09-16 PROCEDURE — 36415 COLL VENOUS BLD VENIPUNCTURE: CPT

## 2025-09-18 LAB
ALBUMIN SERPL ELPH-MCNC: 4.3 G/DL
ALP BLD-CCNC: 93 U/L
ALT SERPL-CCNC: 36 U/L
ANION GAP SERPL CALC-SCNC: 11 MMOL/L
APPEARANCE: CLEAR
AST SERPL-CCNC: 31 U/L
BASOPHILS # BLD AUTO: 0.03 K/UL
BASOPHILS NFR BLD AUTO: 0.6 %
BILIRUB SERPL-MCNC: 0.6 MG/DL
BILIRUBIN URINE: NEGATIVE
BLOOD URINE: NEGATIVE
BUN SERPL-MCNC: 11 MG/DL
CALCIUM SERPL-MCNC: 9.6 MG/DL
CHLORIDE SERPL-SCNC: 107 MMOL/L
CHOLEST SERPL-MCNC: 211 MG/DL
CO2 SERPL-SCNC: 26 MMOL/L
COLOR: YELLOW
CREAT SERPL-MCNC: 0.73 MG/DL
EGFRCR SERPLBLD CKD-EPI 2021: 98 ML/MIN/1.73M2
EOSINOPHIL # BLD AUTO: 0.33 K/UL
EOSINOPHIL NFR BLD AUTO: 6.6 %
ESTIMATED AVERAGE GLUCOSE: 105 MG/DL
GLUCOSE QUALITATIVE U: NEGATIVE MG/DL
GLUCOSE SERPL-MCNC: 89 MG/DL
HBA1C MFR BLD HPLC: 5.3 %
HCT VFR BLD CALC: 36.4 %
HDLC SERPL-MCNC: 58 MG/DL
HGB BLD-MCNC: 11.1 G/DL
IMM GRANULOCYTES NFR BLD AUTO: 0.2 %
KETONES URINE: NEGATIVE MG/DL
LDLC SERPL-MCNC: 138 MG/DL
LEUKOCYTE ESTERASE URINE: ABNORMAL
LYMPHOCYTES # BLD AUTO: 1.82 K/UL
LYMPHOCYTES NFR BLD AUTO: 36.4 %
MAN DIFF?: NORMAL
MCHC RBC-ENTMCNC: 28.3 PG
MCHC RBC-ENTMCNC: 30.5 G/DL
MCV RBC AUTO: 92.9 FL
MONOCYTES # BLD AUTO: 0.35 K/UL
MONOCYTES NFR BLD AUTO: 7 %
NEUTROPHILS # BLD AUTO: 2.46 K/UL
NEUTROPHILS NFR BLD AUTO: 49.2 %
NITRITE URINE: NEGATIVE
NONHDLC SERPL-MCNC: 153 MG/DL
PH URINE: 6
PLATELET # BLD AUTO: 217 K/UL
POTASSIUM SERPL-SCNC: 4.4 MMOL/L
PROT SERPL-MCNC: 6.3 G/DL
PROTEIN URINE: NEGATIVE MG/DL
RBC # BLD: 3.92 M/UL
RBC # FLD: 13.2 %
SODIUM SERPL-SCNC: 143 MMOL/L
SPECIFIC GRAVITY URINE: 1.02
TRIGL SERPL-MCNC: 88 MG/DL
UROBILINOGEN URINE: 0.2 MG/DL
WBC # FLD AUTO: 5 K/UL

## 2025-09-23 PROBLEM — E66.3 OVERWEIGHT: Status: ACTIVE | Noted: 2025-06-20
